# Patient Record
Sex: MALE | Race: WHITE | NOT HISPANIC OR LATINO | URBAN - METROPOLITAN AREA
[De-identification: names, ages, dates, MRNs, and addresses within clinical notes are randomized per-mention and may not be internally consistent; named-entity substitution may affect disease eponyms.]

---

## 2015-09-28 RX ORDER — LEVOTHYROXINE SODIUM 125 MCG
1 TABLET ORAL
Qty: 0 | Refills: 0 | DISCHARGE
Start: 2015-09-28

## 2017-03-01 ENCOUNTER — EMERGENCY (EMERGENCY)
Facility: HOSPITAL | Age: 72
LOS: 1 days | Discharge: HOME | End: 2017-03-01
Attending: EMERGENCY MEDICINE | Admitting: EMERGENCY MEDICINE
Payer: MEDICARE

## 2017-03-01 VITALS
RESPIRATION RATE: 18 BRPM | DIASTOLIC BLOOD PRESSURE: 74 MMHG | TEMPERATURE: 98 F | HEART RATE: 116 BPM | SYSTOLIC BLOOD PRESSURE: 142 MMHG | OXYGEN SATURATION: 96 %

## 2017-03-01 VITALS
HEART RATE: 79 BPM | SYSTOLIC BLOOD PRESSURE: 166 MMHG | RESPIRATION RATE: 19 BRPM | OXYGEN SATURATION: 96 % | TEMPERATURE: 100 F | DIASTOLIC BLOOD PRESSURE: 75 MMHG

## 2017-03-01 DIAGNOSIS — Z98.89 OTHER SPECIFIED POSTPROCEDURAL STATES: Chronic | ICD-10-CM

## 2017-03-01 DIAGNOSIS — Z96.659 PRESENCE OF UNSPECIFIED ARTIFICIAL KNEE JOINT: Chronic | ICD-10-CM

## 2017-03-01 LAB
ALBUMIN SERPL ELPH-MCNC: 4.4 G/DL — SIGNIFICANT CHANGE UP (ref 3.3–5)
ALP SERPL-CCNC: 47 U/L — SIGNIFICANT CHANGE UP (ref 40–120)
ALT FLD-CCNC: 16 U/L — SIGNIFICANT CHANGE UP (ref 4–41)
APPEARANCE UR: CLEAR — SIGNIFICANT CHANGE UP
AST SERPL-CCNC: 15 U/L — SIGNIFICANT CHANGE UP (ref 4–40)
BACTERIA # UR AUTO: SIGNIFICANT CHANGE UP
BASOPHILS # BLD AUTO: 0.04 K/UL — SIGNIFICANT CHANGE UP (ref 0–0.2)
BASOPHILS NFR BLD AUTO: 0.2 % — SIGNIFICANT CHANGE UP (ref 0–2)
BASOPHILS NFR SPEC: 0 % — SIGNIFICANT CHANGE UP (ref 0–2)
BILIRUB SERPL-MCNC: 0.7 MG/DL — SIGNIFICANT CHANGE UP (ref 0.2–1.2)
BILIRUB UR-MCNC: NEGATIVE — SIGNIFICANT CHANGE UP
BLOOD UR QL VISUAL: HIGH
BUN SERPL-MCNC: 14 MG/DL — SIGNIFICANT CHANGE UP (ref 7–23)
CALCIUM SERPL-MCNC: 9.2 MG/DL — SIGNIFICANT CHANGE UP (ref 8.4–10.5)
CHLORIDE SERPL-SCNC: 100 MMOL/L — SIGNIFICANT CHANGE UP (ref 98–107)
CO2 SERPL-SCNC: 23 MMOL/L — SIGNIFICANT CHANGE UP (ref 22–31)
COLOR SPEC: SIGNIFICANT CHANGE UP
CREAT SERPL-MCNC: 0.92 MG/DL — SIGNIFICANT CHANGE UP (ref 0.5–1.3)
EOSINOPHIL # BLD AUTO: 0.36 K/UL — SIGNIFICANT CHANGE UP (ref 0–0.5)
EOSINOPHIL NFR BLD AUTO: 2 % — SIGNIFICANT CHANGE UP (ref 0–6)
EOSINOPHIL NFR FLD: 1 % — SIGNIFICANT CHANGE UP (ref 0–6)
GLUCOSE SERPL-MCNC: 107 MG/DL — HIGH (ref 70–99)
GLUCOSE UR-MCNC: NEGATIVE — SIGNIFICANT CHANGE UP
HCT VFR BLD CALC: 39.6 % — SIGNIFICANT CHANGE UP (ref 39–50)
HGB BLD-MCNC: 13 G/DL — SIGNIFICANT CHANGE UP (ref 13–17)
IMM GRANULOCYTES NFR BLD AUTO: 3.4 % — HIGH (ref 0–1.5)
KETONES UR-MCNC: NEGATIVE — SIGNIFICANT CHANGE UP
LEUKOCYTE ESTERASE UR-ACNC: NEGATIVE — SIGNIFICANT CHANGE UP
LYMPHOCYTES # BLD AUTO: 10.06 K/UL — HIGH (ref 1–3.3)
LYMPHOCYTES # BLD AUTO: 54.5 % — HIGH (ref 13–44)
LYMPHOCYTES NFR SPEC AUTO: 47 % — HIGH (ref 13–44)
MANUAL SMEAR VERIFICATION: SIGNIFICANT CHANGE UP
MCHC RBC-ENTMCNC: 28.6 PG — SIGNIFICANT CHANGE UP (ref 27–34)
MCHC RBC-ENTMCNC: 32.8 % — SIGNIFICANT CHANGE UP (ref 32–36)
MCV RBC AUTO: 87 FL — SIGNIFICANT CHANGE UP (ref 80–100)
MONOCYTES # BLD AUTO: 1.69 K/UL — HIGH (ref 0–0.9)
MONOCYTES NFR BLD AUTO: 9.2 % — SIGNIFICANT CHANGE UP (ref 2–14)
MONOCYTES NFR BLD: 9 % — SIGNIFICANT CHANGE UP (ref 2–9)
MORPHOLOGY BLD-IMP: NORMAL — SIGNIFICANT CHANGE UP
MUCOUS THREADS # UR AUTO: SIGNIFICANT CHANGE UP
NEUTROPHIL AB SER-ACNC: 40 % — LOW (ref 43–77)
NEUTROPHILS # BLD AUTO: 5.69 K/UL — SIGNIFICANT CHANGE UP (ref 1.8–7.4)
NEUTROPHILS NFR BLD AUTO: 30.7 % — LOW (ref 43–77)
NITRITE UR-MCNC: NEGATIVE — SIGNIFICANT CHANGE UP
PH UR: 6 — SIGNIFICANT CHANGE UP (ref 4.6–8)
PLATELET # BLD AUTO: 113 K/UL — LOW (ref 150–400)
PLATELET COUNT - ESTIMATE: SIGNIFICANT CHANGE UP
PMV BLD: 11.2 FL — SIGNIFICANT CHANGE UP (ref 7–13)
POTASSIUM SERPL-MCNC: 4.4 MMOL/L — SIGNIFICANT CHANGE UP (ref 3.5–5.3)
POTASSIUM SERPL-SCNC: 4.4 MMOL/L — SIGNIFICANT CHANGE UP (ref 3.5–5.3)
PROT SERPL-MCNC: 6.6 G/DL — SIGNIFICANT CHANGE UP (ref 6–8.3)
PROT UR-MCNC: NEGATIVE — SIGNIFICANT CHANGE UP
RBC # BLD: 4.55 M/UL — SIGNIFICANT CHANGE UP (ref 4.2–5.8)
RBC # FLD: 13.6 % — SIGNIFICANT CHANGE UP (ref 10.3–14.5)
RBC CASTS # UR COMP ASSIST: SIGNIFICANT CHANGE UP (ref 0–?)
SODIUM SERPL-SCNC: 140 MMOL/L — SIGNIFICANT CHANGE UP (ref 135–145)
SP GR SPEC: 1.01 — SIGNIFICANT CHANGE UP (ref 1–1.03)
UROBILINOGEN FLD QL: NORMAL E.U. — SIGNIFICANT CHANGE UP (ref 0.1–0.2)
VARIANT LYMPHS # BLD: 3 % — SIGNIFICANT CHANGE UP
WBC # BLD: 18.46 K/UL — HIGH (ref 3.8–10.5)
WBC # FLD AUTO: 18.46 K/UL — HIGH (ref 3.8–10.5)
WBC UR QL: SIGNIFICANT CHANGE UP (ref 0–?)

## 2017-03-01 PROCEDURE — 99284 EMERGENCY DEPT VISIT MOD MDM: CPT | Mod: GC

## 2017-03-01 PROCEDURE — 71020: CPT | Mod: 26

## 2017-03-01 RX ORDER — SODIUM CHLORIDE 9 MG/ML
1000 INJECTION INTRAMUSCULAR; INTRAVENOUS; SUBCUTANEOUS ONCE
Qty: 0 | Refills: 0 | Status: COMPLETED | OUTPATIENT
Start: 2017-03-01 | End: 2017-03-01

## 2017-03-01 RX ADMIN — SODIUM CHLORIDE 1000 MILLILITER(S): 9 INJECTION INTRAMUSCULAR; INTRAVENOUS; SUBCUTANEOUS at 17:38

## 2017-03-01 RX ADMIN — SODIUM CHLORIDE 1000 MILLILITER(S): 9 INJECTION INTRAMUSCULAR; INTRAVENOUS; SUBCUTANEOUS at 15:34

## 2017-03-01 NOTE — ED ADULT TRIAGE NOTE - CHIEF COMPLAINT QUOTE
pt presents via ems for evaluation of ams, possible seizure- found making strange sounds with arms clenched to chest. per EMS son did chest compressions x 1 minute because "he was unsure if his father was dead". per ems, pt is appropriately answering questions FS: 109 PMHX: seizures, leukemia, thyroid ca, mitral valve prolapse, vtach, anemia

## 2017-03-01 NOTE — ED PROVIDER NOTE - OBJECTIVE STATEMENT
70 y/o M w/ Hx thyroid CA (resolved), seizure d/o pw suspected seizure.  Pt witnessed event, w/ LOC, clonus of UE consistent w/ prior seizures.  Event lasted for approx 1 min, w/ post-ictal state.  Denies recent fever, chills, cough, CP, SOB, AP, n/v, d/c.  Pt asymptomatic at time of exam. 70 y/o M w/ Hx thyroid CA (resolved), seizure d/o pw suspected seizure.  Pt witnessed event, w/ LOC, clonus of UE consistent w/ prior seizures, no urinary incontinence.  Event lasted for approx 1 min, w/ post-ictal state.  Denies recent fever, chills, cough, CP, SOB, AP, n/v, d/c.  Pt asymptomatic at time of exam.  Does not take regular anti-epileptic.

## 2017-03-01 NOTE — ED PROVIDER NOTE - PROGRESS NOTE DETAILS
on further d/w pt, has been skipping doses of keppra, encouraged to take all medications as directed.  Remains at baseline mental status.  Stable for d/c. on further d/w pt, has been skipping doses of keppra intermittently, encouraged to take all medications as directed.  Remains at baseline mental status.  to take todays dose (keppra 1250 BID)- Stable for d/c.

## 2019-11-13 ENCOUNTER — OUTPATIENT (OUTPATIENT)
Dept: OUTPATIENT SERVICES | Facility: HOSPITAL | Age: 74
LOS: 1 days | End: 2019-11-13
Payer: MEDICARE

## 2019-11-13 VITALS
RESPIRATION RATE: 16 BRPM | TEMPERATURE: 98 F | DIASTOLIC BLOOD PRESSURE: 80 MMHG | HEART RATE: 63 BPM | SYSTOLIC BLOOD PRESSURE: 147 MMHG | HEIGHT: 76 IN | WEIGHT: 248.9 LBS | OXYGEN SATURATION: 96 %

## 2019-11-13 DIAGNOSIS — Z98.89 OTHER SPECIFIED POSTPROCEDURAL STATES: Chronic | ICD-10-CM

## 2019-11-13 DIAGNOSIS — S84.10XA INJURY OF PERONEAL NERVE AT LOWER LEG LEVEL, UNSPECIFIED LEG, INITIAL ENCOUNTER: Chronic | ICD-10-CM

## 2019-11-13 DIAGNOSIS — Z98.49 CATARACT EXTRACTION STATUS, UNSPECIFIED EYE: Chronic | ICD-10-CM

## 2019-11-13 DIAGNOSIS — Z12.11 ENCOUNTER FOR SCREENING FOR MALIGNANT NEOPLASM OF COLON: ICD-10-CM

## 2019-11-13 DIAGNOSIS — Z89.422 ACQUIRED ABSENCE OF OTHER LEFT TOE(S): Chronic | ICD-10-CM

## 2019-11-13 DIAGNOSIS — Z98.890 OTHER SPECIFIED POSTPROCEDURAL STATES: Chronic | ICD-10-CM

## 2019-11-13 DIAGNOSIS — Z86.010 PERSONAL HISTORY OF COLONIC POLYPS: ICD-10-CM

## 2019-11-13 DIAGNOSIS — Z96.659 PRESENCE OF UNSPECIFIED ARTIFICIAL KNEE JOINT: Chronic | ICD-10-CM

## 2019-11-13 DIAGNOSIS — Z01.818 ENCOUNTER FOR OTHER PREPROCEDURAL EXAMINATION: ICD-10-CM

## 2019-11-13 DIAGNOSIS — D69.6 THROMBOCYTOPENIA, UNSPECIFIED: ICD-10-CM

## 2019-11-13 DIAGNOSIS — G47.33 OBSTRUCTIVE SLEEP APNEA (ADULT) (PEDIATRIC): ICD-10-CM

## 2019-11-13 DIAGNOSIS — Z87.19 PERSONAL HISTORY OF OTHER DISEASES OF THE DIGESTIVE SYSTEM: ICD-10-CM

## 2019-11-13 DIAGNOSIS — D50.9 IRON DEFICIENCY ANEMIA, UNSPECIFIED: ICD-10-CM

## 2019-11-13 DIAGNOSIS — K55.20 ANGIODYSPLASIA OF COLON WITHOUT HEMORRHAGE: ICD-10-CM

## 2019-11-13 LAB
ALBUMIN SERPL ELPH-MCNC: 4.8 G/DL — SIGNIFICANT CHANGE UP (ref 3.3–5)
ALP SERPL-CCNC: 53 U/L — SIGNIFICANT CHANGE UP (ref 40–120)
ALT FLD-CCNC: 15 U/L — SIGNIFICANT CHANGE UP (ref 10–45)
ANION GAP SERPL CALC-SCNC: 11 MMOL/L — SIGNIFICANT CHANGE UP (ref 5–17)
AST SERPL-CCNC: 14 U/L — SIGNIFICANT CHANGE UP (ref 10–40)
BILIRUB SERPL-MCNC: 0.4 MG/DL — SIGNIFICANT CHANGE UP (ref 0.2–1.2)
BLD GP AB SCN SERPL QL: NEGATIVE — SIGNIFICANT CHANGE UP
BUN SERPL-MCNC: 11 MG/DL — SIGNIFICANT CHANGE UP (ref 7–23)
CALCIUM SERPL-MCNC: 9.3 MG/DL — SIGNIFICANT CHANGE UP (ref 8.4–10.5)
CHLORIDE SERPL-SCNC: 101 MMOL/L — SIGNIFICANT CHANGE UP (ref 96–108)
CO2 SERPL-SCNC: 27 MMOL/L — SIGNIFICANT CHANGE UP (ref 22–31)
CREAT SERPL-MCNC: 0.97 MG/DL — SIGNIFICANT CHANGE UP (ref 0.5–1.3)
GLUCOSE SERPL-MCNC: 89 MG/DL — SIGNIFICANT CHANGE UP (ref 70–99)
POTASSIUM SERPL-MCNC: 4.4 MMOL/L — SIGNIFICANT CHANGE UP (ref 3.5–5.3)
POTASSIUM SERPL-SCNC: 4.4 MMOL/L — SIGNIFICANT CHANGE UP (ref 3.5–5.3)
PROT SERPL-MCNC: 6.8 G/DL — SIGNIFICANT CHANGE UP (ref 6–8.3)
RH IG SCN BLD-IMP: POSITIVE — SIGNIFICANT CHANGE UP
SODIUM SERPL-SCNC: 139 MMOL/L — SIGNIFICANT CHANGE UP (ref 135–145)

## 2019-11-13 PROCEDURE — 86901 BLOOD TYPING SEROLOGIC RH(D): CPT

## 2019-11-13 PROCEDURE — 80053 COMPREHEN METABOLIC PANEL: CPT

## 2019-11-13 PROCEDURE — G0463: CPT

## 2019-11-13 PROCEDURE — 86850 RBC ANTIBODY SCREEN: CPT

## 2019-11-13 PROCEDURE — 86900 BLOOD TYPING SEROLOGIC ABO: CPT

## 2019-11-13 NOTE — H&P PST ADULT - HISTORY OF PRESENT ILLNESS
75 yo male with h/o CLL (on Venetoclax), thrombocytopenia, atrial fibrillation s/p cardioversion (no AC at present), seizure history (last 2016 - on Keppra), and ELISSA presents for colonoscopy and endoscopy on 11/26/2019.    Pt's platelets were 55 on 11/13/19. Pt's wife states that Heme/Onc Dr Eddy recommended that patient have platelet transfusion prior to proposed colonoscopy/endoscopy.

## 2019-11-13 NOTE — H&P PST ADULT - NSICDXPROBLEM_GEN_ALL_CORE_FT
PROBLEM DIAGNOSES  Problem: Colon cancer screening  Assessment and Plan: Colonoscopy  Endoscopy    Problem: Thrombocytopenia  Assessment and Plan: Call Dr Eddy ([facility]) to request plan/recommendations for periop management og thrombocytopenia. Attempted to call 11/13/2019, however office was already closed    Problem: ELISSA (obstructive sleep apnea)  Assessment and Plan: ELISSA Precautions  End[facility] Book[facility] notified

## 2019-11-13 NOTE — H&P PST ADULT - ANESTHESIA, PREVIOUS REACTION, PROFILE
no personal history of anesthesia complications; mother took a long time to wake up from anesthesia - did not require reintubation no personal history of anesthesia complications; 'mother took a long time to wake up from anesthesia" with arm surgery - did not require reintubation

## 2019-11-13 NOTE — H&P PST ADULT - ABILITY TO HEAR (WITH HEARING AID OR HEARING APPLIANCE IF NORMALLY USED):
Mildly to Moderately Impaired: difficulty hearing in some environments or speaker may need to increase volume or speak distinctly/no HA
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
## 2019-11-13 NOTE — H&P PST ADULT - OTHER CARE PROVIDERS
Dr Barrie Cordova Cardiologist [phone]; Dr Tarun Seaman [phone]; Dr Eddy Heme/Onc [phone]; Dr Lugo Neurologist [phone]

## 2019-11-26 ENCOUNTER — RESULT REVIEW (OUTPATIENT)
Age: 74
End: 2019-11-26

## 2019-11-26 ENCOUNTER — OUTPATIENT (OUTPATIENT)
Dept: OUTPATIENT SERVICES | Facility: HOSPITAL | Age: 74
LOS: 1 days | End: 2019-11-26
Payer: MEDICARE

## 2019-11-26 DIAGNOSIS — Z98.89 OTHER SPECIFIED POSTPROCEDURAL STATES: Chronic | ICD-10-CM

## 2019-11-26 DIAGNOSIS — Z98.890 OTHER SPECIFIED POSTPROCEDURAL STATES: Chronic | ICD-10-CM

## 2019-11-26 DIAGNOSIS — S84.10XA INJURY OF PERONEAL NERVE AT LOWER LEG LEVEL, UNSPECIFIED LEG, INITIAL ENCOUNTER: Chronic | ICD-10-CM

## 2019-11-26 DIAGNOSIS — Z98.49 CATARACT EXTRACTION STATUS, UNSPECIFIED EYE: Chronic | ICD-10-CM

## 2019-11-26 DIAGNOSIS — K55.20 ANGIODYSPLASIA OF COLON WITHOUT HEMORRHAGE: ICD-10-CM

## 2019-11-26 DIAGNOSIS — Z86.010 PERSONAL HISTORY OF COLONIC POLYPS: ICD-10-CM

## 2019-11-26 DIAGNOSIS — Z01.818 ENCOUNTER FOR OTHER PREPROCEDURAL EXAMINATION: ICD-10-CM

## 2019-11-26 DIAGNOSIS — Z87.19 PERSONAL HISTORY OF OTHER DISEASES OF THE DIGESTIVE SYSTEM: ICD-10-CM

## 2019-11-26 DIAGNOSIS — D50.9 IRON DEFICIENCY ANEMIA, UNSPECIFIED: ICD-10-CM

## 2019-11-26 DIAGNOSIS — Z96.659 PRESENCE OF UNSPECIFIED ARTIFICIAL KNEE JOINT: Chronic | ICD-10-CM

## 2019-11-26 DIAGNOSIS — Z89.422 ACQUIRED ABSENCE OF OTHER LEFT TOE(S): Chronic | ICD-10-CM

## 2019-11-26 PROCEDURE — C1889: CPT

## 2019-11-26 PROCEDURE — 45388 COLONOSCOPY W/ABLATION: CPT

## 2019-11-26 PROCEDURE — 45380 COLONOSCOPY AND BIOPSY: CPT | Mod: XS

## 2019-11-26 PROCEDURE — 88305 TISSUE EXAM BY PATHOLOGIST: CPT

## 2019-11-26 PROCEDURE — 88305 TISSUE EXAM BY PATHOLOGIST: CPT | Mod: 26

## 2019-11-26 PROCEDURE — 45385 COLONOSCOPY W/LESION REMOVAL: CPT | Mod: XS

## 2019-11-26 PROCEDURE — 43235 EGD DIAGNOSTIC BRUSH WASH: CPT

## 2019-11-27 LAB — SURGICAL PATHOLOGY STUDY: SIGNIFICANT CHANGE UP

## 2020-07-29 NOTE — ED ADULT NURSE REASSESSMENT NOTE - NS ED NURSE REASSESS COMMENT FT1
Pt awake and alert x 3 no seizure activity noted  sinus on monitor b/p wnl. Pt denies any chest pain c/o feeling dizzy.  iv placed fluids infusing per md orders awaiting dispo.
[address]23 Detailed

## 2020-12-11 ENCOUNTER — INPATIENT (INPATIENT)
Facility: HOSPITAL | Age: 75
LOS: 2 days | Discharge: HOME | DRG: 65 | End: 2020-12-14
Attending: PSYCHIATRY & NEUROLOGY | Admitting: PSYCHIATRY & NEUROLOGY
Payer: MEDICARE

## 2020-12-11 VITALS
HEIGHT: 76 IN | DIASTOLIC BLOOD PRESSURE: 76 MMHG | HEART RATE: 78 BPM | WEIGHT: 255.96 LBS | SYSTOLIC BLOOD PRESSURE: 154 MMHG | RESPIRATION RATE: 19 BRPM | TEMPERATURE: 99 F | OXYGEN SATURATION: 97 %

## 2020-12-11 DIAGNOSIS — Z89.422 ACQUIRED ABSENCE OF OTHER LEFT TOE(S): Chronic | ICD-10-CM

## 2020-12-11 DIAGNOSIS — Z98.89 OTHER SPECIFIED POSTPROCEDURAL STATES: Chronic | ICD-10-CM

## 2020-12-11 DIAGNOSIS — R29.898 OTHER SYMPTOMS AND SIGNS INVOLVING THE MUSCULOSKELETAL SYSTEM: ICD-10-CM

## 2020-12-11 DIAGNOSIS — S84.10XA INJURY OF PERONEAL NERVE AT LOWER LEG LEVEL, UNSPECIFIED LEG, INITIAL ENCOUNTER: Chronic | ICD-10-CM

## 2020-12-11 DIAGNOSIS — Z98.890 OTHER SPECIFIED POSTPROCEDURAL STATES: Chronic | ICD-10-CM

## 2020-12-11 DIAGNOSIS — Z98.49 CATARACT EXTRACTION STATUS, UNSPECIFIED EYE: Chronic | ICD-10-CM

## 2020-12-11 DIAGNOSIS — Z96.659 PRESENCE OF UNSPECIFIED ARTIFICIAL KNEE JOINT: Chronic | ICD-10-CM

## 2020-12-11 LAB
ACANTHOCYTES BLD QL SMEAR: SLIGHT — SIGNIFICANT CHANGE UP
ALBUMIN SERPL ELPH-MCNC: 4.5 G/DL — SIGNIFICANT CHANGE UP (ref 3.3–5)
ALP SERPL-CCNC: 50 U/L — SIGNIFICANT CHANGE UP (ref 40–120)
ALT FLD-CCNC: 17 U/L — SIGNIFICANT CHANGE UP (ref 10–45)
ANION GAP SERPL CALC-SCNC: 11 MMOL/L — SIGNIFICANT CHANGE UP (ref 5–17)
ANISOCYTOSIS BLD QL: SLIGHT — SIGNIFICANT CHANGE UP
APPEARANCE UR: CLEAR — SIGNIFICANT CHANGE UP
AST SERPL-CCNC: 16 U/L — SIGNIFICANT CHANGE UP (ref 10–40)
BASOPHILS # BLD AUTO: 0.1 K/UL — SIGNIFICANT CHANGE UP (ref 0–0.2)
BASOPHILS NFR BLD AUTO: 1 % — SIGNIFICANT CHANGE UP (ref 0–2)
BILIRUB SERPL-MCNC: 0.5 MG/DL — SIGNIFICANT CHANGE UP (ref 0.2–1.2)
BILIRUB UR-MCNC: NEGATIVE — SIGNIFICANT CHANGE UP
BUN SERPL-MCNC: 12 MG/DL — SIGNIFICANT CHANGE UP (ref 7–23)
CALCIUM SERPL-MCNC: 8.8 MG/DL — SIGNIFICANT CHANGE UP (ref 8.4–10.5)
CHLORIDE SERPL-SCNC: 104 MMOL/L — SIGNIFICANT CHANGE UP (ref 96–108)
CO2 SERPL-SCNC: 26 MMOL/L — SIGNIFICANT CHANGE UP (ref 22–31)
COLOR SPEC: SIGNIFICANT CHANGE UP
CREAT SERPL-MCNC: 0.93 MG/DL — SIGNIFICANT CHANGE UP (ref 0.5–1.3)
DACRYOCYTES BLD QL SMEAR: SLIGHT — SIGNIFICANT CHANGE UP
DIFF PNL FLD: NEGATIVE — SIGNIFICANT CHANGE UP
ELLIPTOCYTES BLD QL SMEAR: SLIGHT — SIGNIFICANT CHANGE UP
EOSINOPHIL # BLD AUTO: 0.48 K/UL — SIGNIFICANT CHANGE UP (ref 0–0.5)
EOSINOPHIL NFR BLD AUTO: 5 % — SIGNIFICANT CHANGE UP (ref 0–6)
GIANT PLATELETS BLD QL SMEAR: PRESENT — SIGNIFICANT CHANGE UP
GLUCOSE SERPL-MCNC: 88 MG/DL — SIGNIFICANT CHANGE UP (ref 70–99)
GLUCOSE UR QL: NEGATIVE — SIGNIFICANT CHANGE UP
HCT VFR BLD CALC: 38.4 % — LOW (ref 39–50)
HGB BLD-MCNC: 12.1 G/DL — LOW (ref 13–17)
KETONES UR-MCNC: NEGATIVE — SIGNIFICANT CHANGE UP
LEUKOCYTE ESTERASE UR-ACNC: NEGATIVE — SIGNIFICANT CHANGE UP
LYMPHOCYTES # BLD AUTO: 1.55 K/UL — SIGNIFICANT CHANGE UP (ref 1–3.3)
LYMPHOCYTES # BLD AUTO: 16 % — SIGNIFICANT CHANGE UP (ref 13–44)
MANUAL SMEAR VERIFICATION: SIGNIFICANT CHANGE UP
MCHC RBC-ENTMCNC: 27.9 PG — SIGNIFICANT CHANGE UP (ref 27–34)
MCHC RBC-ENTMCNC: 31.5 GM/DL — LOW (ref 32–36)
MCV RBC AUTO: 88.5 FL — SIGNIFICANT CHANGE UP (ref 80–100)
MONOCYTES # BLD AUTO: 1.35 K/UL — HIGH (ref 0–0.9)
MONOCYTES NFR BLD AUTO: 14 % — SIGNIFICANT CHANGE UP (ref 2–14)
NEUTROPHILS # BLD AUTO: 3.38 K/UL — SIGNIFICANT CHANGE UP (ref 1.8–7.4)
NEUTROPHILS NFR BLD AUTO: 34 % — LOW (ref 43–77)
NEUTS BAND # BLD: 1 % — SIGNIFICANT CHANGE UP (ref 0–8)
NITRITE UR-MCNC: NEGATIVE — SIGNIFICANT CHANGE UP
NRBC # BLD: 0 /100 — SIGNIFICANT CHANGE UP (ref 0–0)
PH UR: 6.5 — SIGNIFICANT CHANGE UP (ref 5–8)
PLAT MORPH BLD: ABNORMAL
PLATELET # BLD AUTO: 49 K/UL — LOW (ref 150–400)
POIKILOCYTOSIS BLD QL AUTO: SLIGHT — SIGNIFICANT CHANGE UP
POTASSIUM SERPL-MCNC: 4.1 MMOL/L — SIGNIFICANT CHANGE UP (ref 3.5–5.3)
POTASSIUM SERPL-SCNC: 4.1 MMOL/L — SIGNIFICANT CHANGE UP (ref 3.5–5.3)
PROT SERPL-MCNC: 6 G/DL — SIGNIFICANT CHANGE UP (ref 6–8.3)
PROT UR-MCNC: NEGATIVE — SIGNIFICANT CHANGE UP
RBC # BLD: 4.34 M/UL — SIGNIFICANT CHANGE UP (ref 4.2–5.8)
RBC # FLD: 13 % — SIGNIFICANT CHANGE UP (ref 10.3–14.5)
RBC BLD AUTO: ABNORMAL
SODIUM SERPL-SCNC: 141 MMOL/L — SIGNIFICANT CHANGE UP (ref 135–145)
SP GR SPEC: 1.01 — LOW (ref 1.01–1.02)
TROPONIN T, HIGH SENSITIVITY RESULT: 26 NG/L — SIGNIFICANT CHANGE UP (ref 0–51)
TROPONIN T, HIGH SENSITIVITY RESULT: 28 NG/L — SIGNIFICANT CHANGE UP (ref 0–51)
UROBILINOGEN FLD QL: NEGATIVE — SIGNIFICANT CHANGE UP
VARIANT LYMPHS # BLD: 29 % — HIGH (ref 0–6)
WBC # BLD: 9.67 K/UL — SIGNIFICANT CHANGE UP (ref 3.8–10.5)
WBC # FLD AUTO: 9.67 K/UL — SIGNIFICANT CHANGE UP (ref 3.8–10.5)

## 2020-12-11 PROCEDURE — 71045 X-RAY EXAM CHEST 1 VIEW: CPT | Mod: 26

## 2020-12-11 PROCEDURE — 93010 ELECTROCARDIOGRAM REPORT: CPT

## 2020-12-11 PROCEDURE — 70496 CT ANGIOGRAPHY HEAD: CPT | Mod: 26

## 2020-12-11 PROCEDURE — 99233 SBSQ HOSP IP/OBS HIGH 50: CPT

## 2020-12-11 PROCEDURE — 99285 EMERGENCY DEPT VISIT HI MDM: CPT | Mod: CS,GC

## 2020-12-11 PROCEDURE — 70498 CT ANGIOGRAPHY NECK: CPT | Mod: 26

## 2020-12-11 NOTE — ED PROVIDER NOTE - CLINICAL SUMMARY MEDICAL DECISION MAKING FREE TEXT BOX
75 male, Hx: Seizure Disorder (1250 mg Keppra, daily, compliant), HTN, HLD, AFib (not A/C) - presents with B/L UE numbness/tingling extending from B/L wrist to the hands since Monday 12/7/2020. MRI Today with concern for cortical infarct. Exam, presentation, and history concerning for CVA vs. Seizure Disorder - minimal concern for meningitis, encephalitis, trauma. Plan: CBC, CMP, EKG, CXR, Ua, UCX, Neuro Consult for admission.

## 2020-12-11 NOTE — ED PROVIDER NOTE - ATTENDING CONTRIBUTION TO CARE
I have personally performed a face to face medical and diagnostic evaluation of the patient. I have discussed with and reviewed the Resident's note and agree with the History, ROS, Physical Exam and MDM unless otherwise indicated. A brief summary of my personal evaluation and impression can be found below.    75F hx of seizure on keppra HTN HLD Afib no AC presents with a cc of abnormal MRI in setting of b/l UE numbness and tingling to UE, since Monday. No other complaints. Otherwise feels well. Per tech had abnormal MRI and was sent to ED for eval. Denies n/v/f/c/cp/sob. Denies headache, syncope, lightheadedness, dizziness. Denies chest palpitations, abdominal pain. Denies edema. Denies dysuria, hematuria, BRBPR, tarry stools, diarrhea, constipation.    All other ROS negative, except as above and as per HPI and ROS section.    VITALS: Initial triage and subsequent vitals have been reviewed by me.  GEN APPEARANCE: WDWN, alert, non-toxic, NAD  HEAD: Atraumatic.  EYES: PERRLa, EOMI, vision grossly intact.   NECK: Supple  CV: RRR, S1S2, no c/r/m/g. Cap refill <2 seconds. No bruits.   LUNGS: CTAB. No abnormal breath sounds.  ABDOMEN: Soft, NTND. No guarding or rebound.   MSK/EXT: No spinal or extremity point tenderness. No CVA ttp. Pelvis stable. No peripheral edema.  NEURO: Alert, follows commands. Weight bearing normal. steady gait. Speech normal. mild difficulty with hand  RUE, difficulty with index to pinky, otherwise Sensation and motor normal x4 extremities. CN2-12 normal, ambulating normally.  SKIN: Warm, dry and intact. No rash.  PSYCH: Appropriate    Plan/MDM: 75M hx of seizure dx presents with a cc of b/l UE weakness found to have abnormal MRI today prompting ED visit, no other complaints. Will check labs, discuss with neuro, reassess, dispo thereafter.

## 2020-12-11 NOTE — ED PROVIDER NOTE - PHYSICAL EXAMINATION
GEN - NAD; well appearing; A+Ox3   HEAD - NC/AT, No visible Ecchymosis, No Abrasions, No Lacerations/Skin Tears     EYES - EOMI, PERRL, no conjunctival pallor, no scleral icterus  ENT -   mucous membranes  moist , no discharge      NECK - Neck supple, No LAD, No Swelling  PULM - CTA B/L,  symmetric breath sounds  COR -  RRR, S1 S2, no murmurs  ABD - NT/ND, soft, no guarding, no rebound, no masses    BACK - no CVA tenderness  EXTREMS - 0+ edema, no gross deformity, warm and well perfused, no calf tenderness/swelling/erythema    SKIN - no rash or bruising      NEUROLOGIC - alert, CN3-12 intact, sensation nl, moving all 4 ext with 5/5 Strength; Slight Residual Weakness noticed on Ulnar Division of Right Hand (4/5)

## 2020-12-11 NOTE — ED PROVIDER NOTE - PMH
Atrial fibrillation  s/p cardioversion x 3 - last 1/2019 - no longer on AC  Charcot's joint of foot, left    CLL (chronic lymphocytic leukemia)  diagnosed 11/2007 - was on imbrutinib, currently taking venetoclax  H/O thrombocytopenia    Hypothyroidism  acquired - s/p thyroidectomy 1975 for papillary cancer of thyroid  Left foot drop    Mitral valve prolapse    Non-healing open wound of toe  left great toe - follows with ID - has recently demonstrated healing - no surgical intervention currently planned  Obesity    Osteomyelitis    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's, while in  - treated with isoniazid  PTSD (post-traumatic stress disorder)    Seizure  first was 1990's - on Keppra, last seizure was 2016  Sleep apnea  mild, per family - no CPAP  SVT (supraventricular tachycardia)    Thyroid carcinoma    V tach    Vaso vagal episode
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
## 2020-12-11 NOTE — ED ADULT NURSE NOTE - PAIN RATING/NUMBER SCALE (0-10): REST
This is a recent snapshot of the patient's [facility] medical record.  For current drug dose and complete information and questions, call [phone] or In Basket pool, [facility] home infusion ([id number])  CSN Number:  [id number]      
0

## 2020-12-11 NOTE — ED ADULT NURSE NOTE - OBJECTIVE STATEMENT
76 y/o male  arrives to the ER via EMS complaining of bilateral numbness on the arms. PMH of HTN, Seizures on kepra. Pt is A&Ox4, speaking coherently. Pt states this Monday he started felling tingling weakness and numbness on both arms. Pt went to his PCP who ordered a MRI of the brain. MRI had  abnormal findings, reason the patient was sent to the ER for further examination. Patient states not felling tingling or numbness at this time, neuro assessment with not deficits On assessment airway is patent, breathing spontaneously and unlabored, skin is dry, warm and color appropriate to race.abdomen is soft, non distended, non tender, full  ROM in all extremities, equal strengh, sensitivity in all extremities.  Pt denies SOB, chest pain, N/V/D, fevers, chills. Comfort measures provided. call bell within reach, bed locked in the lowest position. will continue to reassess .

## 2020-12-11 NOTE — ED PROVIDER NOTE - OBJECTIVE STATEMENT
75 male, Hx: Seizure Disorder (1250 mg Keppra, daily, compliant), HTN, HLD, AFib (not A/C) - presents with B/L UE numbness/tingling extending from B/L wrist to the hands since Monday 12/7/2020. Denies any visual changes, nausea, vomiting, aphasia, other focal weakness/numbness/tingling. Denies any headache. Denies any fevers, chills, cough, CP, SOB, abdominal pain, diarrhea, constipation, bloody stools, dysuric symptoms. Last seizure was Jul4 2020. Denies any LOC; CP, Palpitations, SOB.     Saw PMD - was sent for MRI Head today, where MRI tech told them he saw a large infarct - and was sent here for further eval and management. Symptoms persist at present.

## 2020-12-11 NOTE — ED PROVIDER NOTE - NS ED ROS FT
Constitution: No Fever or chills  Eyes: No visual changes  HEENT: No cough, No Discharge, No Rhinorrhea, No URI symptoms  Cardio: No Chest pain, No Palpitations, No Dyspnea  Resp: No SOB, No Wheezing  GI: No abdominal pain, No Nausea, No Vomiting, No Constipation, No Diarrhea  : No burning upon urination, trouble urinating, no foul odor from urine  MSK: No Back pain, (+) B/L UE Numbness and Tingling, No Weakness  Neuro: No Headache, No changes to Vision; (L) Foot Drop Known to patient  Skin: No rashes, No Bruising, No Swelling

## 2020-12-11 NOTE — ED ADULT TRIAGE NOTE - ARRIVAL FROM
Home Crescentic Advancement Flap Text: The defect edges were debeveled with a #15 scalpel blade.  Given the location of the defect and the proximity to free margins a crescentic advancement flap was deemed most appropriate.  Using a sterile surgical marker, the appropriate advancement flap was drawn incorporating the defect and placing the expected incisions within the relaxed skin tension lines where possible.    The area thus outlined was incised deep to adipose tissue with a #15 scalpel blade.  The skin margins were undermined to an appropriate distance in all directions utilizing iris scissors.

## 2020-12-12 PROBLEM — M14.672 CHARCOT'S JOINT, LEFT ANKLE AND FOOT: Chronic | Status: ACTIVE | Noted: 2019-11-13

## 2020-12-12 PROBLEM — I48.91 UNSPECIFIED ATRIAL FIBRILLATION: Chronic | Status: ACTIVE | Noted: 2019-11-13

## 2020-12-12 PROBLEM — R76.11 NONSPECIFIC REACTION TO TUBERCULIN SKIN TEST WITHOUT ACTIVE TUBERCULOSIS: Chronic | Status: ACTIVE | Noted: 2019-11-13

## 2020-12-12 PROBLEM — S91.109A: Chronic | Status: ACTIVE | Noted: 2019-11-13

## 2020-12-12 LAB
ANION GAP SERPL CALC-SCNC: 10 MMOL/L — SIGNIFICANT CHANGE UP (ref 5–17)
BUN SERPL-MCNC: 10 MG/DL — SIGNIFICANT CHANGE UP (ref 7–23)
CALCIUM SERPL-MCNC: 8.8 MG/DL — SIGNIFICANT CHANGE UP (ref 8.4–10.5)
CHLORIDE SERPL-SCNC: 104 MMOL/L — SIGNIFICANT CHANGE UP (ref 96–108)
CO2 SERPL-SCNC: 24 MMOL/L — SIGNIFICANT CHANGE UP (ref 22–31)
CREAT SERPL-MCNC: 0.81 MG/DL — SIGNIFICANT CHANGE UP (ref 0.5–1.3)
GLUCOSE SERPL-MCNC: 98 MG/DL — SIGNIFICANT CHANGE UP (ref 70–99)
HCT VFR BLD CALC: 38.1 % — LOW (ref 39–50)
HCV AB S/CO SERPL IA: 0.19 S/CO — SIGNIFICANT CHANGE UP (ref 0–0.99)
HCV AB SERPL-IMP: SIGNIFICANT CHANGE UP
HGB BLD-MCNC: 12.2 G/DL — LOW (ref 13–17)
MCHC RBC-ENTMCNC: 28.2 PG — SIGNIFICANT CHANGE UP (ref 27–34)
MCHC RBC-ENTMCNC: 32 GM/DL — SIGNIFICANT CHANGE UP (ref 32–36)
MCV RBC AUTO: 88.2 FL — SIGNIFICANT CHANGE UP (ref 80–100)
NRBC # BLD: 0 /100 WBCS — SIGNIFICANT CHANGE UP (ref 0–0)
PLATELET # BLD AUTO: 48 K/UL — LOW (ref 150–400)
POTASSIUM SERPL-MCNC: 4.1 MMOL/L — SIGNIFICANT CHANGE UP (ref 3.5–5.3)
POTASSIUM SERPL-SCNC: 4.1 MMOL/L — SIGNIFICANT CHANGE UP (ref 3.5–5.3)
RBC # BLD: 4.32 M/UL — SIGNIFICANT CHANGE UP (ref 4.2–5.8)
RBC # FLD: 13.1 % — SIGNIFICANT CHANGE UP (ref 10.3–14.5)
SARS-COV-2 RNA SPEC QL NAA+PROBE: SIGNIFICANT CHANGE UP
SODIUM SERPL-SCNC: 138 MMOL/L — SIGNIFICANT CHANGE UP (ref 135–145)
WBC # BLD: 8.5 K/UL — SIGNIFICANT CHANGE UP (ref 3.8–10.5)
WBC # FLD AUTO: 8.5 K/UL — SIGNIFICANT CHANGE UP (ref 3.8–10.5)

## 2020-12-12 PROCEDURE — 99223 1ST HOSP IP/OBS HIGH 75: CPT | Mod: GC

## 2020-12-12 PROCEDURE — 95720 EEG PHY/QHP EA INCR W/VEEG: CPT

## 2020-12-12 RX ORDER — ACYCLOVIR SODIUM 500 MG
2 VIAL (EA) INTRAVENOUS
Qty: 0 | Refills: 0 | DISCHARGE

## 2020-12-12 RX ORDER — VENETOCLAX 100 MG/1
20 TABLET, FILM COATED ORAL
Refills: 0 | Status: DISCONTINUED | OUTPATIENT
Start: 2020-12-12 | End: 2020-12-14

## 2020-12-12 RX ORDER — ATORVASTATIN CALCIUM 80 MG/1
80 TABLET, FILM COATED ORAL AT BEDTIME
Refills: 0 | Status: DISCONTINUED | OUTPATIENT
Start: 2020-12-12 | End: 2020-12-14

## 2020-12-12 RX ORDER — NYSTATIN CREAM 100000 [USP'U]/G
1 CREAM TOPICAL EVERY 12 HOURS
Refills: 0 | Status: DISCONTINUED | OUTPATIENT
Start: 2020-12-12 | End: 2020-12-14

## 2020-12-12 RX ORDER — LACOSAMIDE 50 MG/1
200 TABLET ORAL ONCE
Refills: 0 | Status: DISCONTINUED | OUTPATIENT
Start: 2020-12-12 | End: 2020-12-14

## 2020-12-12 RX ORDER — DILTIAZEM HCL 120 MG
1 CAPSULE, EXT RELEASE 24 HR ORAL
Qty: 0 | Refills: 0 | DISCHARGE

## 2020-12-12 RX ORDER — LEVETIRACETAM 250 MG/1
1250 TABLET, FILM COATED ORAL DAILY
Refills: 0 | Status: DISCONTINUED | OUTPATIENT
Start: 2020-12-12 | End: 2020-12-14

## 2020-12-12 RX ORDER — LEVOTHYROXINE SODIUM 125 MCG
200 TABLET ORAL
Refills: 0 | Status: DISCONTINUED | OUTPATIENT
Start: 2020-12-12 | End: 2020-12-14

## 2020-12-12 RX ORDER — PREGABALIN 225 MG/1
1000 CAPSULE ORAL DAILY
Refills: 0 | Status: DISCONTINUED | OUTPATIENT
Start: 2020-12-12 | End: 2020-12-14

## 2020-12-12 RX ORDER — INFLUENZA VIRUS VACCINE 15; 15; 15; 15 UG/.5ML; UG/.5ML; UG/.5ML; UG/.5ML
0.5 SUSPENSION INTRAMUSCULAR ONCE
Refills: 0 | Status: DISCONTINUED | OUTPATIENT
Start: 2020-12-12 | End: 2020-12-14

## 2020-12-12 RX ORDER — ERGOCALCIFEROL 1.25 MG/1
50000 CAPSULE ORAL
Refills: 0 | Status: DISCONTINUED | OUTPATIENT
Start: 2020-12-12 | End: 2020-12-14

## 2020-12-12 RX ORDER — ACYCLOVIR SODIUM 500 MG
200 VIAL (EA) INTRAVENOUS DAILY
Refills: 0 | Status: DISCONTINUED | OUTPATIENT
Start: 2020-12-12 | End: 2020-12-14

## 2020-12-12 RX ORDER — VENETOCLAX 100 MG/1
10 TABLET, FILM COATED ORAL
Refills: 0 | Status: DISCONTINUED | OUTPATIENT
Start: 2020-12-12 | End: 2020-12-12

## 2020-12-12 RX ORDER — LEVETIRACETAM 250 MG/1
1500 TABLET, FILM COATED ORAL AT BEDTIME
Refills: 0 | Status: DISCONTINUED | OUTPATIENT
Start: 2020-12-12 | End: 2020-12-14

## 2020-12-12 RX ORDER — CARVEDILOL PHOSPHATE 80 MG/1
6.25 CAPSULE, EXTENDED RELEASE ORAL EVERY 12 HOURS
Refills: 0 | Status: DISCONTINUED | OUTPATIENT
Start: 2020-12-12 | End: 2020-12-14

## 2020-12-12 RX ORDER — FERROUS SULFATE 325(65) MG
325 TABLET ORAL AT BEDTIME
Refills: 0 | Status: DISCONTINUED | OUTPATIENT
Start: 2020-12-12 | End: 2020-12-14

## 2020-12-12 RX ADMIN — ATORVASTATIN CALCIUM 80 MILLIGRAM(S): 80 TABLET, FILM COATED ORAL at 21:25

## 2020-12-12 RX ADMIN — NYSTATIN CREAM 1 APPLICATION(S): 100000 CREAM TOPICAL at 18:34

## 2020-12-12 RX ADMIN — VENETOCLAX 10 MILLIGRAM(S): 100 TABLET, FILM COATED ORAL at 21:25

## 2020-12-12 RX ADMIN — CARVEDILOL PHOSPHATE 6.25 MILLIGRAM(S): 80 CAPSULE, EXTENDED RELEASE ORAL at 06:18

## 2020-12-12 RX ADMIN — Medication 200 MILLIGRAM(S): at 12:31

## 2020-12-12 RX ADMIN — LEVETIRACETAM 1500 MILLIGRAM(S): 250 TABLET, FILM COATED ORAL at 21:25

## 2020-12-12 RX ADMIN — LEVETIRACETAM 1250 MILLIGRAM(S): 250 TABLET, FILM COATED ORAL at 12:31

## 2020-12-12 RX ADMIN — PREGABALIN 1000 MICROGRAM(S): 225 CAPSULE ORAL at 12:49

## 2020-12-12 RX ADMIN — Medication 325 MILLIGRAM(S): at 21:25

## 2020-12-12 RX ADMIN — Medication 200 MICROGRAM(S): at 08:22

## 2020-12-12 RX ADMIN — CARVEDILOL PHOSPHATE 6.25 MILLIGRAM(S): 80 CAPSULE, EXTENDED RELEASE ORAL at 17:53

## 2020-12-12 RX ADMIN — Medication 1 TABLET(S): at 12:30
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
## 2020-12-12 NOTE — H&P ADULT - NSHPOUTPATIENTPROVIDERS_GEN_ALL_CORE
PCP: Glen Guillen [phone]  CardiologistL Dr. Barrie Mullins [phone]  Oncologist: DR. Vipul Root [phone]  Neurologist: Dr. Bui [phone] PCP: Glen Guillen [phone]  Neurologist: Dr. Sergio Bui [phone]  CardiologistL Dr. Barrie Mullins [phone]  Electrophysilogst Dr. Gab Seaman [phone]  Oncologist: Dr. Vipul Root with [facility] [phone]

## 2020-12-12 NOTE — CONSULT NOTE ADULT - ASSESSMENT
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 fronto-parietal ischemic infarct.     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 etiology ? cardioembolic vs. r/o other causes   CT angio: left parietal recent, acute infarct, no flow limiting stenosis  on vEEG to r/o focal seizures. c/w Keppra 1250 AM, 1500 qHS   hx of p.afib s/p cardioversion x 3. He has been in sinus per the wife  (He was on Eliquis but taken off since prior a.fib was thought to be due to chemo)  No prior history of bleeding, despite thrombocytopenia.   EP to Interrogate loop recorder. TTE if no recent records.   May need watchman's procedure if full AC difficult and if the source is cardioembolic.   Cardiology Dr. Cordova, Dr. Rooney will be seeing. await card input.   c/w Aspirin, Lipitor. BP control. Permissive HTN  Dr. Vipul Eddy (outpt heme).  DVT ppx    - 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 fronto-parietal ischemic infarct.     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 etiology ? cardioembolic vs. r/o other causes   CT angio: left parietal recent, acute infarct, no flow limiting stenosis  no source of infection, neg UA and neg CXR. no leukocytosis/no fever.   on vEEG to r/o focal seizures. c/w Keppra 1250 AM, 1500 qHS   hx of p.afib s/p cardioversion x 3. He has been in sinus per the wife  (He was on Eliquis but taken off since prior a.fib was thought to be due to chemo)  No prior history of bleeding, despite thrombocytopenia.   EP to Interrogate loop recorder. TTE if no recent records.   May need watchman's procedure if full AC difficult and if the source is cardioembolic.   Cardiology Dr. Cordova, Dr. Rooney will be seeing. await card input.   c/w Aspirin, Lipitor. BP control. Permissive HTN  Dr. Vipul Eddy (outpt heme).  DVT ppx    - [facility]  - [phone]
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
## 2020-12-12 NOTE — H&P ADULT - ASSESSMENT
INCOMPLETE  Assessment:  75y R-handed M with h/o seizures, paroxysmal non-valvular AF s/p ablation, NHL on CT c/b thrombocytopenia, prior transient Vtach, MVP and CLL being admitted for event capture and characterization of suspected possible focal sensory seizures with preserved awareness as well as possible ESUS stroke work up.     Plan  [] vEEG   [] Continue Keppra 1250 AM, 1500 qHS  [] Rescue 2mg ativan for sz>3 min and/or any GTC. Second line lacosamide 200mg IVP.   [] Aspirin 81mg daily (trend platelets)  [] Atorvastatin 80mg daily.   [] Normotension given LKN 11/29/20   [] Fasting lipid panel, A1C    Chidi Richey, DO  PGY-3 Neurology Service INCOMPLETE  Assessment:  75y R-handed M with h/o seizures, paroxysmal non-valvular AF s/p ablation, NHL on CT c/b thrombocytopenia, prior transient Vtach, MVP and CLL being admitted for event capture and characterization of suspected possible focal sensory seizures with preserved awareness as well as possible ESUS stroke work up.     MRI Brain from [facility] reviewed: Notable DWI +, ADC - , T2 FLAIR hyperintesnity in L. pareital region which respects gyri/sulci borders, atypical for stroke like appearance.  On T2 Flair, additionally seen are similar more chronic appearing R. sided parietal hyper-intensities also respecting gyral/sucli borders.     12/11/20 NONCONTRAST HEAD CT SCAN:  1. Left parietal recent, acute infarct, correlates with findings on MRI brain 12/11/2020.  2. Additional nonacute infarcts as above.  3. No acute intracranial hemorrhage, extra-axial fluid collection or midline shift.    CT Angio Neck w/ IV Cont (12.11.20 @ 23:55)   CT ANGIOGRAPHY NECK: Patent cervical vasculature.  No hemodynamically significant carotid stenosis or flow-limiting vertebral artery stenosis.  No evidence of dissection.  CT ANGIOGRAPHY BRAIN: No vessel occlusion, flow-limiting stenosis or aneurysm is identified about the [place].    Plan  [] vEEG   [] Continue Keppra 1250 AM, 1500 qHS  [] Rescue 2mg ativan for sz>3 min and/or any GTC. Second line lacosamide 200mg IVP.   [] Aspirin 81mg daily (trend platelets)  [] Atorvastatin 80mg daily.   [] Normotension given LKN 11/29/20   [] ESUS work up.  PT had recent TTE in 09/2020 which had shown no valvular acute disease, mild LVH.    [] PT already has ILR implanted and most recently had intermittent PVCs noted but otherwise no arrhythmias as of late.  Does have h/o pAF s/p cardioversion x 3 and Vtach s/p ablation.   --->PT had previously been on Eliquis, but taken off due to thought that AF had been secondary to prior CT and that PT's current CT (Venetoclax) had significant S/E of thrombocytopenia.   [] Fasting lipid panel, A1C  [] LE dopplers to assess for DVT given hypercoagulable state and off AC.  If negative, add SCD B/L.  Trend platelets to see if/when safe to restart chemical DVT ppx.     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 parietal infarct.      LKN: No clear specific time, though symptoms became worse on 11/29/20  NIHSS: 3  Baseline MRS: 1  Not a tPA or thrombectomy candidate due to LKN time.     Semiology: L. arm elevation then beating his own chest-->L hand clonic movements which wake him up followed by an ictal cry with subsequent B/L tonic clonic convulsion lasting up to two minutes a/w hypoxia, tongue bite and post-ictal confusion.      Impression: Brief episodes of paresthesias, numbness, apraxia and poor coordination L hand >R hand due to B/L parietal dysfunction.  MRI brain notable for suspected acute L. parietal infarct and possible chronic R. parietal infarct vs ischemic changes though pattern of respecting gyri/sulci almost in cortical ribboning fashion is concerning for other etiology of lesions with DDx including ongoing focal seizure activity, PRES, infectious or autoimmune induced injury.  Suspected mechanism of stroke includes ESUS (hypercoagulable state of Ca) vs. cardioembolic 2* recurrent AF or other arrhythmia.  Combined with frequent reported sensory + phenomena lasting about 1 minute at a time, raises possibility of focal sensory seizures w/o impaired awareness.       Plan  [] vEEG   [] Continue Keppra 1250 AM, 1500 qHS for now.   [] Rescue 2mg ativan for sz>3 min and/or any GTC. Second line lacosamide 200mg IVP if no contra-indication from cardiac perspective.   [] Aspirin 81mg daily (trend platelets and discuss with stroke team regarding risk/benefit of AC as well as chemical DVT ppx)  [] Atorvastatin 80mg daily.   [] Normotension given LKN 11/29/20   [] ESUS work up.   ---->PT had recent TTE in 09/2020 which had shown no valvular acute disease, mild LVH.    ---->[] PT already has ILR implanted and most recently had intermittent PVCs noted but otherwise no arrhythmias as of late.  Does have h/o pAF s/p cardioversion x 3 and Vtach s/p ablation.   --->PT had previously been on Eliquis, but taken off due to thought that AF had been secondary to prior CT and that PT's current CT (Venetoclax) had significant S/E of thrombocytopenia.   --->MRI brain notable for acute/subacute L. parietal infarct and as well as chronic R. frontoparietal, R. anterior nelson radiata and L. cerebellar infarcts.   [] Fasting lipid panel, A1C  [] LE dopplers to assess for DVT given hypercoagulable state and off AC.  If negative, add SCD B/L.  Trend platelets to see if/when safe to restart chemical DVT ppx.   [] Tight glucose control (long-term goal HgbA1c < 6%)  [] Stroke education and counseling  [] Continuous cardiac telemetry to monitor for arrhythmia  [] Consult PT's electrophysiologist for ILR interrogation.     Stroke rehabilitation:  []Physical therapy, occupational therapy, speech therapy consults  []Consult social work and case management for help with discharge    Chidi Richey, DO  PGY-3 Neurology Service    For final recommendations, please note attending attestation below.

## 2020-12-12 NOTE — H&P ADULT - HISTORY OF PRESENT ILLNESS
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 fronto-parietal ischemic infarct.  LKN unclear, but perhaps 11/29/20.  Over past several weeks, but more notably in the last 1 week, he has had increasing numbness in B/L hands L>R, dropping items L>R, feeling like he has to think about moving his hand in specific directions.  He describes the episodes as happening about 1 minute at a time, intermittent and no specific provoking factors. 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 fronto-parietal ischemic infarct.  LKN unclear, but perhaps 11/29/20.  Over past several weeks, but more notably in the last 1 week, he has had increasing numbness in B/L hands L>R, dropping items L>R, feeling like he has to think about moving his hand in specific directions.  He describes the episodes as happening about 1 minute at a time, intermittent and no specific provoking factors.  His wife has observed him having difficulty with common every day tasks such as putting his pants on or using the phone correctly and that he has been less conversant.  PT notes no sudden onset of symptoms but gradual worsening.  He has had longstanding seizures since 1998, though wife is unclear of their origin.  He had been well controlled for several years on Keppra 1000 BID, though last had a GTC memorial day 05/2020.  Prior to this had been seizure free for 4 years.  After last GTC, Keppra had been increased to 1250 qAM and 1500 qHS by his neurologist, Dr. Bui, which he tolerated well.  Has not had sensory or other focal seizures which he is aware of.  PT and wife deny knowledge of any prior strokes.  PT has never required to be intubated for seizures, has no known allergies to AEDs and has never had SE.     Semiology: L. hand clonic movements which wake him up followed by an ictal cry with subsequent B/L tonic clonic convulsion lasting up to two minutes a/w hypoxia, tongue bite and post-ictal confusion.      Regarding his oncologic history, PT had longstanding dormant NHL.  However, in 2014, he developed an enlarged R. cervical LN which was resected and PT was started on ibrutinib.  In addition, PT underwent thyroidectomy for suspected thyroid cancer.  He continued this until 2018 with good control of his NHL but then began having episodes of paroxysmal AF.  PT denies any symptoms such as palpitations or light-headedness when in AF.  He then underwent 3 separate cardioversions to NSR.  Secondary to recurrent relapses into pAF after cardioversion, PT had ILR implanted 2018 which has not shown further AF but has noted intermittent PVCs and one 20 beat occurrence of Vtach.  Due to concern that the arrhythmia was secondary to ibrutinib he was switched to Venetoclax and has been stable on this, though he has had significant thrombocytopenia as a complication.  AS a result, PT had to be taken off Eliquis AC.  Last TTE on 10/28/20 demonstrated mild LV hypertrophy and L. chamber dilation but otherwise was reported unremarkable.  At most recent cardiology F/U, due to recurrent PVCs, PT was recommended to start coreg 6.25 BID for rate control, but PT had not yet started.    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 fronto-parietal ischemic infarct.  LKN unclear, but perhaps 11/29/20.  Over past several weeks, but more notably in the last 1 week, he has had increasing numbness in B/L hands L>R, dropping items L>R, feeling like he has to think about moving his hand in specific directions.  He describes the episodes as happening about 1 minute at a time, intermittent and no specific provoking factors.  His wife has observed him having difficulty with common every day tasks such as putting his pants on or using the phone correctly and that he has been less conversant.  PT notes no sudden onset of symptoms but gradual worsening.  He has had longstanding seizures since 1998, though wife is unclear of their origin.  He had been well controlled for several years on Keppra 1000 BID, though last had a GTC memorial day 05/2020.  Prior to this had been seizure free for 4 years.  After last GTC, Keppra had been increased to 1250 qAM and 1500 qHS by his neurologist, Dr. Bui, which he tolerated well.  Has not had sensory or other focal seizures which he is aware of.  PT and wife deny knowledge of any prior strokes.  PT has never required to be intubated for seizures, has no known allergies to AEDs and has never had SE.     Semiology: L. arm elevation then beating his own chest-->L hand clonic movements which wake him up followed by an ictal cry with subsequent B/L tonic clonic convulsion lasting up to two minutes a/w hypoxia, tongue bite and post-ictal confusion.      Regarding his oncologic history, PT had longstanding dormant small-B cell NHL (at least since 2007).   PT developed thyroid papillary CA s/p thyroidectomy 1975, stable on synthroid since.  In 2014, he developed an enlarged R. cervical LN which was resected and PT was started on ibrutinib.  In addition, PT underwent thyroidectomy for suspected thyroid cancer.  He continued this until 2018 with good control of his NHL but then began having episodes of paroxysmal AF.  PT denies any symptoms such as palpitations or light-headedness when in AF.  He then underwent 3 separate cardioversions and one time use of diltiazem and successfully converted back to NSR.  Secondary to recurrent relapses into pAF after cardioversion, PT had [employer] [id number] ILR placed 2/26/2019 which has not shown further AF but has noted intermittent PVCs and one 20 beat occurrence of Vtach.  Due to concern that the arrhythmia was secondary to ibrutinib he was switched to Venetoclax and has been stable on this, though he has had significant thrombocytopenia as a complication.  AS a result, PT had to be taken off Eliquis AC.  Last TTE on 10/28/20 demonstrated mild LV hypertrophy and L. chamber dilation but otherwise was reported unremarkable.  At most recent cardiology F/U, due to recurrent PVCs, PT was recommended to start coreg 6.25 BID for rate control, but PT had not yet started.

## 2020-12-12 NOTE — H&P ADULT - NSHPLABSRESULTS_GEN_ALL_CORE
12.1   9.67  )-----------( 49       ( 11 Dec 2020 21:03 )             38.4   12  141  |  104  |  12  ----------------------------<  88  4.1   |  26  |  0.93    Ca    8.8      11 Dec 2020 21:03    TPro  6.0  /  Alb  4.5  /  TBili  0.5  /  DBili  x   /  AST  16  /  ALT  17  /  AlkPhos  50      Urinalysis Basic - ( 11 Dec 2020 23:10 )  Color: Light Yellow / Appearance: Clear / S.007 / pH: x  Gluc: x / Ketone: Negative  / Bili: Negative / Urobili: Negative   Blood: x / Protein: Negative / Nitrite: Negative   Leuk Esterase: Negative / RBC: x / WBC x   Sq Epi: x / Non Sq Epi: x / Bacteria: x    MRI Brain from [facility] reviewed: Notable DWI +, ADC - , T2 FLAIR hyperintesnity in L. pareital region which respects gyri/sulci borders, atypical for stroke like appearance.  On T2 Flair, additionally seen are similar more chronic appearing R. sided parietal hyper-intensities also respecting gyral/sucli borders.     20 NONCONTRAST HEAD CT SCAN:  1. Left parietal recent, acute infarct, correlates with findings on MRI brain 2020.  2. Additional nonacute infarcts as above.  3. No acute intracranial hemorrhage, extra-axial fluid collection or midline shift.    CT Angio Neck w/ IV Cont (20 @ 23:55)   CT ANGIOGRAPHY NECK: Patent cervical vasculature.  No hemodynamically significant carotid stenosis or flow-limiting vertebral artery stenosis.  No evidence of dissection.  CT ANGIOGRAPHY BRAIN: No vessel occlusion, flow-limiting stenosis or aneurysm is identified about the [place]. 12.1   9.67  )-----------( 49       ( 11 Dec 2020 21:03 )             38.4   12-  141  |  104  |  12  ----------------------------<  88  4.1   |  26  |  0.93    Ca    8.8      11 Dec 2020 21:03    TPro  6.0  /  Alb  4.5  /  TBili  0.5  /  DBili  x   /  AST  16  /  ALT  17  /  AlkPhos  50  12    Urinalysis Basic - ( 11 Dec 2020 23:10 )  Color: Light Yellow / Appearance: Clear / S.007 / pH: x  Gluc: x / Ketone: Negative  / Bili: Negative / Urobili: Negative   Blood: x / Protein: Negative / Nitrite: Negative   Leuk Esterase: Negative / RBC: x / WBC x   Sq Epi: x / Non Sq Epi: x / Bacteria: x    MRI Brain from [facility] reviewed: Notable DWI +, ADC - , T2 FLAIR hyperintensity in L. parietal region which respects gyri/sulci borders.  On T2 Flair, additionally seen are similar more chronic appearing R. sided parietal hyper-intensities also respecting gyral/sucli borders.     20 NONCONTRAST HEAD CT SCAN:  1. Left parietal recent, acute infarct, correlates with findings on MRI brain 2020.  2. Additional nonacute infarcts as above.  3. No acute intracranial hemorrhage, extra-axial fluid collection or midline shift.    CT Angio Neck w/ IV Cont (20 @ 23:55)   CT ANGIOGRAPHY NECK: Patent cervical vasculature.  No hemodynamically significant carotid stenosis or flow-limiting vertebral artery stenosis.  No evidence of dissection.  CT ANGIOGRAPHY BRAIN: No vessel occlusion, flow-limiting stenosis or aneurysm is identified about the [place].

## 2020-12-12 NOTE — H&P ADULT - NSHPPHYSICALEXAM_GEN_ALL_CORE
Vital Signs Last 24 Hrs  T(C): 36.6 (12 Dec 2020 04:24), Max: 37.1 (11 Dec 2020 20:11)  T(F): 97.8 (12 Dec 2020 04:24), Max: 98.7 (11 Dec 2020 20:11)  HR: 73 (12 Dec 2020 04:24) (59 - 78)  BP: 140/65 (12 Dec 2020 04:24) (140/65 - 161/76)  BP(mean): 97 (11 Dec 2020 23:45) (97 - 97)  RR: 18 (12 Dec 2020 04:24) (16 - 19)  SpO2: 96% (12 Dec 2020 04:24) (96% - 100%)    General: Thin man sitting up in bed in NAD.   Cardiac: II/VI systolic murmur, NSR on telemetry now.   Resp: CTA throughout, breathing normally on room air.   Skin: Numerous ecchymoses in various stages of healing on hands/legs.   MSK: R foot second toe partial amputation.  Significant hammer toes B/L   Neuro:   Mental Status: Awake, alert, oriented to person, place, situation, time. Flattened affect. Follows simple one step midline commands but not crossed commands.  Has dyscalculia, inattention, L/R confusion, deficits in visuospatial reasoning (cannot copy cube properly, cannot repeat Luria hand motion sequence correctly).  Mild inattention.  Short term and long-term memory deficits.  Line bisection test with midline drawn slightly to L.  No apraxia.   Language: Speech is clear, fluent with preserved naming, repetition and comprehension.    Cranial Nerves: PERRLA (R = 3mm, L = 3mm). Visual fields intact. EOMI no nystagmus. V1-3 intact to light touch.  No facial asymmetry b/l, full eye closure strength b/l. Hearing grossly normal to conversation.  Symmetric palate elevation in midline.  Head turning & shoulder shrug intact b/l. Tongue midline, normal movements, no atrophy.  Motor: Decreased muscle bulk & normal tone.  Akathisia throughout, no startle myoclonus.  5/5 strength throughout                                                                        .	  Sensation: Symmetric B/L preserved sensation to light touch, pin prick, position.    Cortical: Extinction on double simultaneous touch of R. arm and leg.   Agraphesthesia R>L   Reflexes: 3/4 throughout.  Plantar Responses are upgoing on R, mute on L.   Coordination: Bradykinesia and difficulty with rapid alternating movements B/L.  No dysmetria on finger-to-nose.  Mild dysdiadochokinesia R>L hand.

## 2020-12-12 NOTE — EEG REPORT - NS EEG TEXT BOX
Starting: Day 1      12/12/2020      Time: 3:02:52 AM  	Duration: 4h 25m  Daily EEG Visual Analysis FINDINGS:  The background was continuous, spontaneously variable and reactive. During wakefulness, the posterior dominant rhythm consisted of symmetric, well-modulated 8 Hz activity, with amplitude to 30 uV, that attenuated to eye opening.  Low amplitude frontal beta was noted in wakefulness.  Background Slowing: No generalized background slowing was present.  Focal Slowing:  None were present.  Sleep Background: Drowsiness was characterized by fragmentation, attenuation, and slowing of the background activity.   Sleep was characterized by the presence of vertex waves, symmetric sleep spindles and K-complexes.  Other Non-Epileptiform Findings: None were present. Diffuse excess beta activity.  Interictal Epileptiform Activity:  None were present.  Events: There was a button-push event at 7:44a, patient looks alert and no EEG change noted.   Artifacts: Intermittent myogenic and movement artifacts were noted.  ECG: The heart rate on single channel ECG was predominantly between 70-80 BPM and was irregularly irregular.  AEDs: Levetiracetam 1250/1500  EEG Summary: Normal EEG in the awake, drowsy and asleep states.   Impression/Clinical Correlate: This is a normal study. No epileptiform abnormalities noted.   ________________________________________  Fox Fournier MD Epilepsy Fellow, [facility]     Carlton Mary MD PhD Director, Epilepsy Division, [facility]

## 2020-12-12 NOTE — CHART NOTE - NSCHARTNOTEFT_GEN_A_CORE
Discussed case at length with on call [facility] oncologist (associate of Dr. Vipul Eddy), Dr. Glen Grey, who has no objection for patient's venetoclax to be continued while patient is hospitalized.    [facility] Hospitalist consult placed for medical management, awaiting callback.    Discussed with housestaff, pharmacy Discussed case at length with on call [facility] oncologist (associate of Dr. Vipul Eddy), Dr. Glen Grey, who has no objection for patient's venetoclax to be continued while patient is hospitalized.    [facility] Hospitalist consult placed for medical management, awaiting callback.    Discussed with housestaff, pharmacy    ________    Update 12/12/2020    [facility] Hospitalist Dr. Carrillo called back and accepted consult, awaiting note & recommendations.    Patient's Cardiologist, Dr. Barrie Cordova, notified of patient's status, recommended loop recorder interrogation and that colleague Dr. Gab Rooney Cardiology can help with consult for any further recommendations.    Spoke with on call house Cardiology Fellow for loop recorder interrogation, at this time would not  as patient has thrombocytopenia and etiology of stroke likely cardioembolic, may be pursued Monday if patient still in hospital.    Spoke with Dr. Rooney, who will see patient 12/13 AM for Cardiology recommendations ().    Discussed patient's case at length with Stroke Attending Dr. Libman and Stroke Fellow Dr. Villarreal. L parietal infarct on MRI appears acute and given known paroxysmal atrial fibrillation, as well as chronic infarcts seen on right side of brain, mechanism most likely cardioembolic,  recommending anticoagulation when safe & appropriate for patient, as patient currently has thrombocytopenia (Plt 48,000), as well as potential for future evaluation for watchman evaluation when patient has stable and elevated platelet level for being on short term anticoagulation.    discussed above with housestaalfred, Epilepsy Dr. Mary

## 2020-12-12 NOTE — H&P ADULT - ATTENDING COMMENTS
MRI suggestive or acute and chronic embolic stroikes in left and right parietal regions, respectively. There is history suggestive of seizures, but last event was months ago. Will need loop recorder interrogation, and will d/w stroke team.
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
## 2020-12-12 NOTE — H&P ADULT - NSICDXFAMILYHX_GEN_ALL_CORE_FT
FAMILY HISTORY:  Family history of leukemia, Mother - CLL  Family history of lung cancer, father  Family history of pulmonary embolism, Mother

## 2020-12-13 ENCOUNTER — TRANSCRIPTION ENCOUNTER (OUTPATIENT)
Age: 75
End: 2020-12-13

## 2020-12-13 LAB
CULTURE RESULTS: NO GROWTH — SIGNIFICANT CHANGE UP
SPECIMEN SOURCE: SIGNIFICANT CHANGE UP

## 2020-12-13 PROCEDURE — 99223 1ST HOSP IP/OBS HIGH 75: CPT

## 2020-12-13 PROCEDURE — 99233 SBSQ HOSP IP/OBS HIGH 50: CPT | Mod: GC

## 2020-12-13 PROCEDURE — 95720 EEG PHY/QHP EA INCR W/VEEG: CPT

## 2020-12-13 PROCEDURE — 93306 TTE W/DOPPLER COMPLETE: CPT | Mod: 26

## 2020-12-13 PROCEDURE — 93010 ELECTROCARDIOGRAM REPORT: CPT

## 2020-12-13 RX ORDER — ATORVASTATIN CALCIUM 80 MG/1
1 TABLET, FILM COATED ORAL
Qty: 30 | Refills: 0
Start: 2020-12-13

## 2020-12-13 RX ADMIN — ERGOCALCIFEROL 50000 UNIT(S): 1.25 CAPSULE ORAL at 09:00

## 2020-12-13 RX ADMIN — NYSTATIN CREAM 1 APPLICATION(S): 100000 CREAM TOPICAL at 06:33

## 2020-12-13 RX ADMIN — CARVEDILOL PHOSPHATE 6.25 MILLIGRAM(S): 80 CAPSULE, EXTENDED RELEASE ORAL at 06:30

## 2020-12-13 RX ADMIN — Medication 200 MICROGRAM(S): at 09:36

## 2020-12-13 RX ADMIN — VENETOCLAX 20 MILLIGRAM(S): 100 TABLET, FILM COATED ORAL at 21:19

## 2020-12-13 RX ADMIN — Medication 325 MILLIGRAM(S): at 21:19

## 2020-12-13 RX ADMIN — Medication 1 TABLET(S): at 13:09

## 2020-12-13 RX ADMIN — LEVETIRACETAM 1250 MILLIGRAM(S): 250 TABLET, FILM COATED ORAL at 13:12

## 2020-12-13 RX ADMIN — LEVETIRACETAM 1500 MILLIGRAM(S): 250 TABLET, FILM COATED ORAL at 21:19

## 2020-12-13 RX ADMIN — NYSTATIN CREAM 1 APPLICATION(S): 100000 CREAM TOPICAL at 17:58

## 2020-12-13 RX ADMIN — CARVEDILOL PHOSPHATE 6.25 MILLIGRAM(S): 80 CAPSULE, EXTENDED RELEASE ORAL at 17:29

## 2020-12-13 RX ADMIN — ATORVASTATIN CALCIUM 80 MILLIGRAM(S): 80 TABLET, FILM COATED ORAL at 21:19

## 2020-12-13 RX ADMIN — PREGABALIN 1000 MICROGRAM(S): 225 CAPSULE ORAL at 13:08

## 2020-12-13 RX ADMIN — Medication 200 MILLIGRAM(S): at 13:08
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
## 2020-12-13 NOTE — EEG REPORT - NS EEG TEXT BOX
Starting: Day 1      12/12/2020      Time: 8:00 AM  	Duration: 24h 0m  Daily EEG Visual Analysis FINDINGS:  The background was continuous, spontaneously variable and reactive. During wakefulness, the posterior dominant rhythm consisted of symmetric, well-modulated 8 Hz activity, with amplitude to 30 uV, that attenuated to eye opening.  Low amplitude frontal beta was noted in wakefulness.  Background Slowing: No generalized background slowing was present.  Focal Slowing:  None were present.  Sleep Background: Drowsiness was characterized by fragmentation, attenuation, and slowing of the background activity.   Sleep was characterized by the presence of vertex waves, symmetric sleep spindles and K-complexes.  Other Non-Epileptiform Findings: None were present. Diffuse excess beta activity.  Interictal Epileptiform Activity:  None were present.  Events: none  Artifacts: Intermittent myogenic and movement artifacts were noted.  ECG: The heart rate on single channel ECG was predominantly between 70-80 BPM.  AEDs: Levetiracetam 1250/1500  EEG Summary: Normal EEG in the awake, drowsy and asleep states.   Impression/Clinical Correlate: This is a normal study. No epileptiform abnormalities noted.   ________________________________________      Carlton Mary MD PhD Director, Ep[facility]   Starting: Day 2   12/12/2020 8:00 AM       End: 12/13/2020 8:00 AM       Duration: 24h 0m  Daily EEG Visual Analysis FINDINGS:  The background was continuous, spontaneously variable and reactive. During wakefulness, the posterior dominant rhythm consisted of symmetric, well-modulated 8 Hz activity, with amplitude to 30 uV, that attenuated to eye opening.  Low amplitude frontal beta was noted in wakefulness.  Background Slowing: No generalized background slowing was present.  Focal Slowing:  None were present.  Sleep Background: Drowsiness was characterized by fragmentation, attenuation, and slowing of the background activity.   Sleep was characterized by the presence of vertex waves, symmetric sleep spindles and K-complexes.  Other Non-Epileptiform Findings: None were present. Diffuse excess beta activity.  Interictal Epileptiform Activity:  None were present.  Events: none  Artifacts: Intermittent myogenic and movement artifacts were noted.  ECG: The heart rate on single channel ECG was predominantly between 70-80 BPM.  AEDs: Levetiracetam 1250/1500  EEG Summary: Normal EEG in the awake, drowsy and asleep states.   Impression/Clinical Correlate: This is a normal study. No epileptiform abnormalities noted.   ________________________________________      Carlton Mary MD PhD Director, Epilepsy Division, [facility]

## 2020-12-13 NOTE — DISCHARGE NOTE PROVIDER - CARE PROVIDER_API CALL
Alejandro Lugo  NEUROLOGY  [address] [facility], [address]  [place], [place] [id number]  Phone: [phone]  Fax: [phone]  Established Patient  Follow Up Time: 1-3 days    Haile Guillen  INTERNAL MEDICINE  [address] [place] [address], [address]  [place] [address]  Phone: [phone]  Fax: [phone]  Established Patient  Follow Up Time: 1 week    Vipul Root  Phone: [phone]  Fax: (   )    -  Established Patient  Follow Up Time: 1 week    Dr. Barrie  Phone: [phone]  Fax: (   )    -  Established Patient  Follow Up Time: 1 week    Gianfranco Seaman  Phone: [phone]  Fax: (   )    -  Established Patient  Follow Up Time: 1 week   Alejandro Lugo  NEUROLOGY  [address] [facility], [address]  [place], [place] [id number]  Phone: [phone]  Fax: [phone]  Established Patient  Follow Up Time: 1-3 days    Haile Guillen  INTERNAL MEDICINE  [address] [place] [address] [address]  [place] [address]  Phone: [phone]  Fax: [phone]  Established Patient  Follow Up Time: 1 week    Vipul Root  Phone: [phone]  Fax: (   )    -  Established Patient  Follow Up Time: 1 week    Dr. Barrie  Phone: [phone]  Fax: (   )    -  Established Patient  Follow Up Time: 1 week    Gianfranco Seaman  Phone: [phone]  Fax: (   )    -  Established Patient  Follow Up Time: 1 week    Eric Morales)  Neurology; Vascular Neurology  [address] [facility], [address]  [place] [address]  Phone: [phone]  Fax: [phone]  Follow Up Time:

## 2020-12-13 NOTE — DISCHARGE NOTE PROVIDER - NSDCMRMEDTOKEN_GEN_ALL_CORE_FT
acyclovir 200 mg oral capsule: 1 cap(s) orally once a day  atorvastatin 80 mg oral tablet: 1 tab(s) orally once a day (at bedtime)  Coreg 6.25 mg oral tablet: 1 tab(s) orally 2 times a day  ferrous sulfate 325 mg (65 mg elemental iron) oral tablet: 1 tab(s) orally once a day (at bedtime)  Keppra 500 mg oral tablet: 3 tab(s) orally once a day (at bedtime)  levETIRAcetam 500 mg oral tablet: 2.5 tab(s) orally once a day in AM  levothyroxine 200 mcg (0.2 mg) oral tablet: 1 tab(s) orally 5 times a week -  Sun, Tues, Weds, Thurs, Sat  venetoclax 10 mg oral tablet: 2 tab(s) orally once a day  Vitamin B-100 oral tablet: 1 tab(s) orally once a day  Vitamin B12: 1000 microgram(s) sublingual once a day  Vitamin C 1000 mg oral tablet: 1 tab(s) orally 2 times a day  Vitamin D3 50,000 intl units (1250 mcg) oral capsule: 1 tab(s) orally once a week on sundays   acyclovir 200 mg oral capsule: 1 cap(s) orally once a day  atorvastatin 20 mg oral tablet: 1 tab(s) orally once a day (at bedtime)   Coreg 6.25 mg oral tablet: 1 tab(s) orally 2 times a day  ferrous sulfate 325 mg (65 mg elemental iron) oral tablet: 1 tab(s) orally once a day (at bedtime)  Keppra 500 mg oral tablet: 3 tab(s) orally once a day (at bedtime)  levETIRAcetam 500 mg oral tablet: 2.5 tab(s) orally once a day in AM  levothyroxine 200 mcg (0.2 mg) oral tablet: 1 tab(s) orally 5 times a week -  Sun, Tues, Weds, Thurs, Sat  venetoclax 10 mg oral tablet: 2 tab(s) orally once a day  Vitamin B-100 oral tablet: 1 tab(s) orally once a day  Vitamin B12: 1000 microgram(s) sublingual once a day  Vitamin C 1000 mg oral tablet: 1 tab(s) orally 2 times a day  Vitamin D3 50,000 intl units (1250 mcg) oral capsule: 1 tab(s) orally once a week on sundays

## 2020-12-13 NOTE — CONSULT NOTE ADULT - SUBJECTIVE AND OBJECTIVE BOX
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 fronto-parietal ischemic infarct.  LKN unclear, but perhaps 11/29/20.  Over past several weeks, but more notably in the last 1 week, he has had increasing numbness in B/L hands L>R, dropping items L>R, feeling like he has to think about moving his hand in specific directions.  He describes the episodes as happening about 1 minute at a time, intermittent and no specific provoking factors.  His wife has observed him having difficulty with common every day tasks such as putting his pants on or using the phone correctly and that he has been less conversant.  PT notes no sudden onset of symptoms but gradual worsening.  He has had longstanding seizures since 1998, though wife is unclear of their origin.  He had been well controlled for several years on Keppra 1000 BID, though last had a GTC memorial day 05/2020.  Prior to this had been seizure free for 4 years.  After last GTC, Keppra had been increased to 1250 qAM and 1500 qHS by his neurologist, Dr. Bui, which he tolerated well.  Has not had sensory or other focal seizures which he is aware of.  PT and wife deny knowledge of any prior strokes.  PT has never required to be intubated for seizures, has no known allergies to AEDs and has never had SE.   Regarding his oncologic history, PT had longstanding dormant small-B cell NHL (at least since 2007).   PT developed thyroid papillary CA s/p thyroidectomy 1975, stable on synthroid since.  In 2014, he developed an enlarged R. cervical LN which was resected and PT was started on ibrutinib.  In addition, PT underwent thyroidectomy for suspected thyroid cancer.  He continued this until 2018 with good control of his NHL but then began having episodes of paroxysmal AF.  PT denies any symptoms such as palpitations or light-headedness when in AF.  He then underwent 3 separate cardioversions and one time use of diltiazem and successfully converted back to NSR.  Secondary to recurrent relapses into pAF after cardioversion, PT had [employer] [id number] ILR placed 2/26/2019 which has not shown further AF but has noted intermittent PVCs and one 20 beat occurrence of Vtach.  Due to concern that the arrhythmia was secondary to ibrutinib he was switched to Venetoclax and has been stable on this, though he has had significant thrombocytopenia as a complication.  AS a result, PT had to be taken off Eliquis AC.  Last TTE on 10/28/20 demonstrated mild LV hypertrophy and L. chamber dilation but otherwise was reported unremarkable    Patient denies sob or cp or palitations  No ecg on chart but appears to be in NSR with premature beats  MEDICATIONS:  MEDICATIONS  (STANDING):  acyclovir   Oral Tab/Cap 200 milliGRAM(s) Oral daily  atorvastatin 80 milliGRAM(s) Oral at bedtime  carvedilol 6.25 milliGRAM(s) Oral every 12 hours  cyanocobalamin 1000 MICROGram(s) Oral daily  ergocalciferol 04646 Unit(s) Oral <User Schedule>  ferrous    sulfate 325 milliGRAM(s) Oral at bedtime  influenza   Vaccine 0.5 milliLiter(s) IntraMuscular once  levETIRAcetam 1250 milliGRAM(s) Oral daily  levETIRAcetam 1500 milliGRAM(s) Oral at bedtime  levothyroxine 200 MICROGram(s) Oral <User Schedule>  nystatin Cream 1 Application(s) Topical every 12 hours  venetoclax 20 milliGRAM(s) Oral <User Schedule>  vitamin B complex with vitamin C 1 Tablet(s) Oral daily      PHYSICAL EXAM:  T(C): 36.6 (12-13-20 @ 08:40), Max: 37.1 (12-12-20 @ 16:20)  HR: 63 (12-13-20 @ 08:40) (63 - 95)  BP: 102/68 (12-13-20 @ 08:40) (102/68 - 146/82)  RR: 18 (12-13-20 @ 08:40) (18 - 18)  SpO2: 95% (12-13-20 @ 08:40) (95% - 97%)  Wt(kg): --  I&O's Summary        Appearance: Normal	looks well NAD  HEENT:   Normal oral mucosa, PERRL, EOMI	  Cardiovascular: Normal S1 S2, No JVD, No murmurs ,occasional premature beat  Respiratory: Lungs clear to auscultation, normal effort 	  Gastrointestinal:  Soft, Non-tender, + BS	  Skin: No rashes, No ecchymoses, No cyanosis, warm to touch  Musculoskeletal: Normal range of motion, normal strength  Psychiatry:  Mood & affect appropriate  Ext: No edema  Peripheral pulses palpable 2+ bilaterally      LABS:    CARDIAC MARKERS:                                12.2   8.50  )-----------( 48       ( 12 Dec 2020 07:50 )             38.1     12-12    138  |  104  |  10  ----------------------------<  98  4.1   |  24  |  0.81    Ca    8.8      12 Dec 2020 07:50    TPro  6.0  /  Alb  4.5  /  TBili  0.5  /  DBili  x   /  AST  16  /  ALT  17  /  AlkPhos  50  12-11    proBNP:   Lipid Profile:   HgA1c:   TSH:
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
## 2020-12-13 NOTE — PROGRESS NOTE ADULT - ASSESSMENT
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 fronto-parietal ischemic infarct.     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 etiology ? cardioembolic vs. r/o other causes   CT angio: left parietal recent, acute infarct, no flow limiting stenosis  no source of infection, neg UA and neg CXR. no leukocytosis/no fever.   vEEG neg for focal seizures. c/w Keppra 1250 AM, 1500 qHS   hx of p.afib s/p cardioversion x 3. He has been in sinus per the wife.  (He was on Eliquis but taken off since prior a.fib was thought to be due to chemo)  No prior history of bleeding, despite thrombocytopenia.   s/p EP eval and Interrogation of loop recorder, no definitive a.fib. noted PVC/PACs.  TTE with normal LV  no urgent need for watchman's procedure per EP  Cardiology Dr. Cordova, Dr. Rooney seeing here.  c/w Aspirin, Lipitor. BP control. Permissive HTN  Dr. Vipul Eddy (outpt heme).  DVT ppx    - Dr. MCGOVERN Htet ([facility])  - [phone]

## 2020-12-13 NOTE — CONSULT NOTE ADULT - ASSESSMENT
1. hx of PAF recent ventriclar arrythmias  2. thrombocytopenia  3. acute CVA ? embolic  4. noc ardiac symptoms    Recommend  call EP to check LINK if any arrythmia  complete neuro evaluation  heme/onc followup re: thrombocytopenia  ECG 12 lead and repeat echo  if CVA felt to be embolic and or if there is evidence of afib would call EP to evaluate for watchmnan

## 2020-12-13 NOTE — DISCHARGE NOTE PROVIDER - HOSPITAL COURSE
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 fronto-parietal ischemic infarct.  LKN unclear, but perhaps 11/29/20.  Over past several weeks, but more notably in the last 1 week, he has had increasing numbness in B/L hands L>R, dropping items L>R, feeling like he has to think about moving his hand in specific directions.  He describes the episodes as happening about 1 minute at a time, intermittent and no specific provoking factors.  His wife has observed him having difficulty with common every day tasks such as putting his pants on or using the phone correctly and that he has been less conversant.  PT notes no sudden onset of symptoms but gradual worsening.  He has had longstanding seizures since 1998, though wife is unclear of their origin.  He had been well controlled for several years on Keppra 1000 BID, though last had a GTC memorial day 05/2020.  Prior to this had been seizure free for 4 years.  After last GTC, Keppra had been increased to 1250 qAM and 1500 qHS by his neurologist, Dr. Bui, which he tolerated well.  Has not had sensory or other focal seizures which he is aware of.  PT and wife deny knowledge of any prior strokes.  PT has never required to be intubated for seizures, has no known allergies to AEDs and has never had SE.     Semiology: L. arm elevation then beating his own chest-->L hand clonic movements which wake him up followed by an ictal cry with subsequent B/L tonic clonic convulsion lasting up to two minutes a/w hypoxia, tongue bite and post-ictal confusion.      Regarding his oncologic history, PT had longstanding dormant small-B cell NHL (at least since 2007).   PT developed thyroid papillary CA s/p thyroidectomy 1975, stable on synthroid since.  In 2014, he developed an enlarged R. cervical LN which was resected and PT was started on ibrutinib.  In addition, PT underwent thyroidectomy for suspected thyroid cancer.  He continued this until 2018 with good control of his NHL but then began having episodes of paroxysmal AF.  PT denies any symptoms such as palpitations or light-headedness when in AF.  He then underwent 3 separate cardioversions and one time use of diltiazem and successfully converted back to NSR.  Secondary to recurrent relapses into pAF after cardioversion, PT had Medtronic LNQ11 ILR placed 2/26/2019 which has not shown further AF but has noted intermittent PVCs and one 20 beat occurrence of Vtach.  Due to concern that the arrhythmia was secondary to ibrutinib he was switched to Venetoclax and has been stable on this, though he has had significant thrombocytopenia as a complication.  AS a result, PT had to be taken off Eliquis AC.  Last TTE on 10/28/20 demonstrated mild LV hypertrophy and L. chamber dilation but otherwise was reported unremarkable.  At most recent cardiology F/U, due to recurrent PVCs, PT was recommended to start coreg 6.25 BID for rate control, but PT had not yet started.    (12 Dec 2020 00:05)      Imaging:  CTH w/o contrast, CT Angio Head/Neck w/ IV Cont (12.11.20)    IMPRESSION:  NONCONTRAST HEAD CT SCAN:  1. Left parietal recent, acute infarct, correlates with findings on MRI brain 12/11/2020.  2. Additional nonacute infarcts as above.  3. No acute intracranial hemorrhage, extra-axial fluid collection or midline shift.    CT ANGIOGRAPHY NECK: Patent cervical vasculature.  No hemodynamically significant carotid stenosis or flow-limiting vertebral artery stenosis.  No evidence of dissection.    CT ANGIOGRAPHY BRAIN: No vessel occlusion, flow-limiting stenosis or aneurysm is identified about the [place].      TTE shows ___________________    Hospital course:  VEEG monitoring did not show any seizure activity. Patient was started on atorvastatin 80 mg nightly considering acute left parietal infarct. Antiplatelets held and anticoagulation deferred due to thrombocytopenia.   Electrophysiology consulted, recommended deferring anticoagulation and did not recommend a Watchman at this time.   Cardiology consulted, recommended heme/onc follow up and EKG/echocardiogram.  ILR was interrogated on 12/13 showing:   "(records from 11/20 onwards), and there are mult eps of irregularly irregular heart rate  -Despite this, pt's baseline rhythm is NSR w/ freq APCs and VPCs  -His recorded eps are not far from baseline rates, and irregular beats are likely APCs/VPCs in context of sinus rhythm  -AF cannot be definitely r/o, but is lower on ddx"    Impression: Acute left parietal ischemic infarct likely cardioembolic. No seizures captured.     Plan:  Start anticoagulation when no contraindication  Continue current medications  Outpatient stroke neurology follow up ([phone], Dr. Lugo/Carmen)  Outpatient primary care follow up (Dr. Guillen)  Outpatient cardiology follow up (Dr. Sood)  Outpatient oncology follow up (Dr. 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 fronto-parietal ischemic infarct.  LKN unclear, but perhaps 11/29/20.  Over past several weeks, but more notably in the last 1 week, he has had increasing numbness in B/L hands L>R, dropping items L>R, feeling like he has to think about moving his hand in specific directions.  He describes the episodes as happening about 1 minute at a time, intermittent and no specific provoking factors.  His wife has observed him having difficulty with common every day tasks such as putting his pants on or using the phone correctly and that he has been less conversant.  PT notes no sudden onset of symptoms but gradual worsening.  He has had longstanding seizures since 1998, though wife is unclear of their origin.  He had been well controlled for several years on Keppra 1000 BID, though last had a GTC memorial day 05/2020.  Prior to this had been seizure free for 4 years.  After last GTC, Keppra had been increased to 1250 qAM and 1500 qHS by his neurologist, Dr. Bui, which he tolerated well.  Has not had sensory or other focal seizures which he is aware of.  PT and wife deny knowledge of any prior strokes.  PT has never required to be intubated for seizures, has no known allergies to AEDs and has never had SE.     Semiology: L. arm elevation then beating his own chest-->L hand clonic movements which wake him up followed by an ictal cry with subsequent B/L tonic clonic convulsion lasting up to two minutes a/w hypoxia, tongue bite and post-ictal confusion.      Regarding his oncologic history, PT had longstanding dormant small-B cell NHL (at least since 2007).   PT developed thyroid papillary CA s/p thyroidectomy 1975, stable on synthroid since.  In 2014, he developed an enlarged R. cervical LN which was resected and PT was started on ibrutinib.  In addition, PT underwent thyroidectomy for suspected thyroid cancer.  He continued this until 2018 with good control of his NHL but then began having episodes of paroxysmal AF.  PT denies any symptoms such as palpitations or light-headedness when in AF.  He then underwent 3 separate cardioversions and one time use of diltiazem and successfully converted back to NSR.  Secondary to recurrent relapses into pAF after cardioversion, PT had Medtronic LNQ11 ILR placed 2/26/2019 which has not shown further AF but has noted intermittent PVCs and one 20 beat occurrence of Vtach.  Due to concern that the arrhythmia was secondary to ibrutinib he was switched to Venetoclax and has been stable on this, though he has had significant thrombocytopenia as a complication.  AS a result, PT had to be taken off Eliquis AC.  Last TTE on 10/28/20 demonstrated mild LV hypertrophy and L. chamber dilation but otherwise was reported unremarkable.  At most recent cardiology F/U, due to recurrent PVCs, PT was recommended to start coreg 6.25 BID for rate control, but PT had not yet started.    (12 Dec 2020 00:05)      Imaging:  CTH w/o contrast, CT Angio Head/Neck w/ IV Cont (12.11.20)    IMPRESSION:  NONCONTRAST HEAD CT SCAN:  1. Left parietal recent, acute infarct, correlates with findings on MRI brain 12/11/2020.  2. Additional nonacute infarcts as above.  3. No acute intracranial hemorrhage, extra-axial fluid collection or midline shift.    CT ANGIOGRAPHY NECK: Patent cervical vasculature.  No hemodynamically significant carotid stenosis or flow-limiting vertebral artery stenosis.  No evidence of dissection.    CT ANGIOGRAPHY BRAIN: No vessel occlusion, flow-limiting stenosis or aneurysm is identified about the [place].      TTE with Doppler (w/Cont) (12.13.20)  Conclusions:  1. Normal left ventricular internal dimensions and wall  thicknesses.  2. Normal left ventricular systolic function. No segmental  wall motion abnormalities.   Endocardial visualization  enhanced with intravenous injection of Ultrasonic Enhancing  Agent (Definity).  3. Estimated pulmonary artery systolic pressure equals 37  mm Hg, assuming right atrial pressure equals 8 mm Hg,  consistent with borderline pulmonary pressures.    *** No previous Echo exam.    Hospital course:  VEEG monitoring did not show any seizure activity. Patient was started on atorvastatin 80 mg nightly considering acute left parietal infarct. Antiplatelets held and anticoagulation deferred due to thrombocytopenia.   Electrophysiology consulted, recommended deferring anticoagulation and did not recommend a Watchman at this time.   Cardiology consulted, recommended heme/onc follow up and EKG/echocardiogram.  ILR was interrogated on 12/13 showing:   "(records from 11/20 onwards), and there are mult eps of irregularly irregular heart rate  -Despite this, pt's baseline rhythm is NSR w/ freq APCs and VPCs  -His recorded eps are not far from baseline rates, and irregular beats are likely APCs/VPCs in context of sinus rhythm  -AF cannot be definitely r/o, but is lower on ddx"    Impression: Acute left parietal ischemic infarct likely cardioembolic. No seizures captured.     Plan:  Start anticoagulation when no contraindication  Continue current medications  Outpatient stroke neurology follow up ([phone], Dr. Lugo/Carmen)  Outpatient primary care follow up (Dr. Guillen)  Outpatient cardiology follow up (Dr. Sood)  Outpatient oncology follow up (Dr. 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 fronto-parietal ischemic infarct.  LKN unclear, but perhaps 11/29/20.  Over past several weeks, but more notably in the last 1 week, he has had increasing numbness in B/L hands L>R, dropping items L>R, feeling like he has to think about moving his hand in specific directions.  He describes the episodes as happening about 1 minute at a time, intermittent and no specific provoking factors.  His wife has observed him having difficulty with common every day tasks such as putting his pants on or using the phone correctly and that he has been less conversant.  PT notes no sudden onset of symptoms but gradual worsening.  He has had longstanding seizures since 1998, though wife is unclear of their origin.  He had been well controlled for several years on Keppra 1000 BID, though last had a GTC memorial day 05/2020.  Prior to this had been seizure free for 4 years.  After last GTC, Keppra had been increased to 1250 qAM and 1500 qHS by his neurologist, Dr. Bui, which he tolerated well.  Has not had sensory or other focal seizures which he is aware of.  PT and wife deny knowledge of any prior strokes.  PT has never required to be intubated for seizures, has no known allergies to AEDs and has never had SE.     Semiology: L. arm elevation then beating his own chest-->L hand clonic movements which wake him up followed by an ictal cry with subsequent B/L tonic clonic convulsion lasting up to two minutes a/w hypoxia, tongue bite and post-ictal confusion.      Regarding his oncologic history, PT had longstanding dormant small-B cell NHL (at least since 2007).   PT developed thyroid papillary CA s/p thyroidectomy 1975, stable on synthroid since.  In 2014, he developed an enlarged R. cervical LN which was resected and PT was started on ibrutinib.  In addition, PT underwent thyroidectomy for suspected thyroid cancer.  He continued this until 2018 with good control of his NHL but then began having episodes of paroxysmal AF.  PT denies any symptoms such as palpitations or light-headedness when in AF.  He then underwent 3 separate cardioversions and one time use of diltiazem and successfully converted back to NSR.  Secondary to recurrent relapses into pAF after cardioversion, PT had Medtronic LNQ11 ILR placed 2/26/2019 which has not shown further AF but has noted intermittent PVCs and one 20 beat occurrence of Vtach.  Due to concern that the arrhythmia was secondary to ibrutinib he was switched to Venetoclax and has been stable on this, though he has had significant thrombocytopenia as a complication.  AS a result, PT had to be taken off Eliquis AC.  Last TTE on 10/28/20 demonstrated mild LV hypertrophy and L. chamber dilation but otherwise was reported unremarkable.  At most recent cardiology F/U, due to recurrent PVCs, PT was recommended to start coreg 6.25 BID for rate control, but PT had not yet started.    (12 Dec 2020 00:05)      Imaging:  CTH w/o contrast, CT Angio Head/Neck w/ IV Cont (12.11.20)    IMPRESSION:  NONCONTRAST HEAD CT SCAN:  1. Left parietal recent, acute infarct, correlates with findings on MRI brain 12/11/2020.  2. Additional nonacute infarcts as above.  3. No acute intracranial hemorrhage, extra-axial fluid collection or midline shift.    CT ANGIOGRAPHY NECK: Patent cervical vasculature.  No hemodynamically significant carotid stenosis or flow-limiting vertebral artery stenosis.  No evidence of dissection.    CT ANGIOGRAPHY BRAIN: No vessel occlusion, flow-limiting stenosis or aneurysm is identified about the [place].      TTE with Doppler (w/Cont) (12.13.20)  Conclusions:  1. Normal left ventricular internal dimensions and wall  thicknesses.  2. Normal left ventricular systolic function. No segmental  wall motion abnormalities.   Endocardial visualization  enhanced with intravenous injection of Ultrasonic Enhancing  Agent (Definity).  3. Estimated pulmonary artery systolic pressure equals 37  mm Hg, assuming right atrial pressure equals 8 mm Hg,  consistent with borderline pulmonary pressures.    *** No previous Echo exam.    Hospital course:  VEEG monitoring did not show any seizure activity. Patient was started on atorvastatin 80 mg nightly considering acute left parietal infarct. Antiplatelets held and anticoagulation deferred due to thrombocytopenia.   Electrophysiology consulted, recommended deferring anticoagulation and did not recommend a Watchman at this time.   Cardiology consulted, recommended heme/onc follow up and EKG/echocardiogram.  ILR was interrogated on 12/13 showing:   "(records from 11/20 onwards), and there are mult eps of irregularly irregular heart rate  -Despite this, pt's baseline rhythm is NSR w/ freq APCs and VPCs  -His recorded eps are not far from baseline rates, and irregular beats are likely APCs/VPCs in context of sinus rhythm  -AF cannot be definitely r/o, but is lower on ddx"    Impression: Acute left parietal ischemic infarct likely cardioembolic. No seizures captured.     Plan:  Start anticoagulation when no contraindication per outpatient Cardio/Onc  Continue current medications  Outpatient stroke neurology follow up ([phone], Dr. Lugo/Carmen)  Outpatient primary care follow up (Dr. Guillen)  Outpatient cardiology follow up (Dr. Sood)  Outpatient oncology follow up (Dr. 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 fronto-parietal ischemic infarct.  LKN unclear, but perhaps 11/29/20.  Over past several weeks, but more notably in the last 1 week, he has had increasing numbness in B/L hands L>R, dropping items L>R, feeling like he has to think about moving his hand in specific directions.  He describes the episodes as happening about 1 minute at a time, intermittent and no specific provoking factors.  His wife has observed him having difficulty with common every day tasks such as putting his pants on or using the phone correctly and that he has been less conversant.  PT notes no sudden onset of symptoms but gradual worsening.  He has had longstanding seizures since 1998, though wife is unclear of their origin.  He had been well controlled for several years on Keppra 1000 BID, though last had a GTC memorial day 05/2020.  Prior to this had been seizure free for 4 years.  After last GTC, Keppra had been increased to 1250 qAM and 1500 qHS by his neurologist, Dr. Bui, which he tolerated well.  Has not had sensory or other focal seizures which he is aware of.  PT and wife deny knowledge of any prior strokes.  PT has never required to be intubated for seizures, has no known allergies to AEDs and has never had SE.     Semiology: L. arm elevation then beating his own chest-->L hand clonic movements which wake him up followed by an ictal cry with subsequent B/L tonic clonic convulsion lasting up to two minutes a/w hypoxia, tongue bite and post-ictal confusion.      Regarding his oncologic history, PT had longstanding dormant small-B cell NHL (at least since 2007).   PT developed thyroid papillary CA s/p thyroidectomy 1975, stable on synthroid since.  In 2014, he developed an enlarged R. cervical LN which was resected and PT was started on ibrutinib.  In addition, PT underwent thyroidectomy for suspected thyroid cancer.  He continued this until 2018 with good control of his NHL but then began having episodes of paroxysmal AF.  PT denies any symptoms such as palpitations or light-headedness when in AF.  He then underwent 3 separate cardioversions and one time use of diltiazem and successfully converted back to NSR.  Secondary to recurrent relapses into pAF after cardioversion, PT had Medtronic LNQ11 ILR placed 2/26/2019 which has not shown further AF but has noted intermittent PVCs and one 20 beat occurrence of Vtach.  Due to concern that the arrhythmia was secondary to ibrutinib he was switched to Venetoclax and has been stable on this, though he has had significant thrombocytopenia as a complication.  AS a result, PT had to be taken off Eliquis AC.  Last TTE on 10/28/20 demonstrated mild LV hypertrophy and L. chamber dilation but otherwise was reported unremarkable.  At most recent cardiology F/U, due to recurrent PVCs, PT was recommended to start coreg 6.25 BID for rate control, but PT had not yet started.    (12 Dec 2020 00:05)      Imaging:  CTH w/o contrast, CT Angio Head/Neck w/ IV Cont (12.11.20)    IMPRESSION:  NONCONTRAST HEAD CT SCAN:  1. Left parietal recent, acute infarct, correlates with findings on MRI brain 12/11/2020.  2. Additional nonacute infarcts as above.  3. No acute intracranial hemorrhage, extra-axial fluid collection or midline shift.    CT ANGIOGRAPHY NECK: Patent cervical vasculature.  No hemodynamically significant carotid stenosis or flow-limiting vertebral artery stenosis.  No evidence of dissection.    CT ANGIOGRAPHY BRAIN: No vessel occlusion, flow-limiting stenosis or aneurysm is identified about the [place].      TTE with Doppler (w/Cont) (12.13.20)  Conclusions:  1. Normal left ventricular internal dimensions and wall  thicknesses.  2. Normal left ventricular systolic function. No segmental  wall motion abnormalities.   Endocardial visualization  enhanced with intravenous injection of Ultrasonic Enhancing  Agent (Definity).  3. Estimated pulmonary artery systolic pressure equals 37  mm Hg, assuming right atrial pressure equals 8 mm Hg,  consistent with borderline pulmonary pressures.    *** No previous Echo exam.    Hospital course:  VEEG monitoring did not show any seizure activity. Patient was started on atorvastatin 80 mg nightly considering acute left parietal infarct. Antiplatelets held and anticoagulation deferred due to thrombocytopenia.   Electrophysiology consulted, recommended deferring anticoagulation and did not recommend a Watchman at this time.   Cardiology consulted, recommended heme/onc follow up and EKG/echocardiogram.  ILR was interrogated on 12/13 showing:   "(records from 11/20 onwards), and there are mult eps of irregularly irregular heart rate  -Despite this, pt's baseline rhythm is NSR w/ freq APCs and VPCs  -His recorded eps are not far from baseline rates, and irregular beats are likely APCs/VPCs in context of sinus rhythm  -AF cannot be definitely r/o, but is lower on ddx"  - dopplers performed to r/o DVT and was negative    Impression: Acute left parietal ischemic infarct likely cardioembolic. No seizures captured.     Plan:  Start anticoagulation when no contraindication per outpatient Cardio/Onc  Continue current medications  Outpatient stroke neurology follow up ([phone], Dr. Lugo/Carmen)  Outpatient primary care follow up (Dr. Guillen)  Outpatient cardiology follow up (Dr. Sood)  Outpatient oncology follow up (Dr. Root)

## 2020-12-13 NOTE — DISCHARGE NOTE PROVIDER - NSDCQMPCI_CARD_ALL_CORE
No

## 2020-12-13 NOTE — PROGRESS NOTE ADULT - ASSESSMENT
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 parietal infarct.      LKN: No clear specific time, though symptoms became worse on 11/29/20  NIHSS: 3  Baseline MRS: 1  Not a tPA or thrombectomy candidate due to LKN time.     Semiology: L. arm elevation then beating his own chest-->L hand clonic movements which wake him up followed by an ictal cry with subsequent B/L tonic clonic convulsion lasting up to two minutes a/w hypoxia, tongue bite and post-ictal confusion.      Impression: Brief episodes of paresthesias, numbness, apraxia and poor coordination L hand >R hand due to B/L parietal dysfunction.  MRI brain notable for suspected acute L. parietal infarct and possible chronic R. parietal infarct vs ischemic changes though pattern of respecting gyri/sulci almost in cortical ribboning fashion is concerning for other etiology of lesions with DDx including ongoing focal seizure activity, PRES, infectious or autoimmune induced injury.  Suspected mechanism of stroke includes ESUS (hypercoagulable state of Ca) vs. cardioembolic 2* recurrent AF or other arrhythmia.  Combined with frequent reported sensory + phenomena lasting about 1 minute at a time, raises possibility of focal sensory seizures w/o impaired awareness.       Plan  [] vEEG   [] LE dopplers to assess for DVT given hypercoagulable state and off AC.  If negative, add SCD B/L.  Trend platelets to see if/when safe to restart chemical DVT ppx.   [] Continue Keppra 1250 AM, 1500 qHS for now.   [] Rescue 2mg ativan for sz>3 min and/or any GTC. Second line lacosamide 200mg IVP if no contra-indication from cardiac perspective.   [] Atorvastatin 80mg daily.   [] Fasting lipid panel, A1C  [] PT/OT, S&S    Case to be discussed with epilepsy attending, Dr. 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 parietal infarct.      LKN: No clear specific time, though symptoms became worse on 11/29/20  NIHSS: 3  Baseline MRS: 1  Not a tPA or thrombectomy candidate due to LKN time.     Semiology: L. arm elevation then beating his own chest-->L hand clonic movements which wake him up followed by an ictal cry with subsequent B/L tonic clonic convulsion lasting up to two minutes a/w hypoxia, tongue bite and post-ictal confusion.      Impression: Brief episodes of paresthesias, numbness, apraxia and poor coordination L hand >R hand due to B/L parietal dysfunction.  MRI brain notable for suspected acute L. parietal infarct and possible chronic R. parietal infarct vs ischemic changes though pattern of respecting gyri/sulci almost in cortical ribboning fashion is concerning for other etiology of lesions with DDx including ongoing focal seizure activity, PRES, infectious or autoimmune induced injury.  Suspected mechanism of stroke includes ESUS (hypercoagulable state of Ca) vs. cardioembolic 2* recurrent AF or other arrhythmia.  Combined with frequent reported sensory + phenomena lasting about 1 minute at a time, raises possibility of focal sensory seizures w/o impaired awareness.       Plan  [] vEEG   [] LE dopplers to assess for DVT given hypercoagulable state and off AC.  If negative, add SCD B/L.  Trend platelets to see if/when safe to restart chemical DVT ppx.   [] Continue Keppra 1250 AM, 1500 qHS for now.   [] Rescue 2mg ativan for sz>3 min and/or any GTC. Second line lacosamide 200mg IVP if no contra-indication from cardiac perspective.   [] Atorvastatin 80mg daily.   [] Fasting lipid panel, A1C  [] PT/OT, S&S  [] EP consult placed for possible Watchman and ILR interrogation    Case to be discussed with epilepsy attending, Dr. 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 parietal infarct.      LKN: No clear specific time, though symptoms became worse on 11/29/20  NIHSS: 3  Baseline MRS: 1  Not a tPA or thrombectomy candidate due to LKN time.     Semiology: L. arm elevation then beating his own chest-->L hand clonic movements which wake him up followed by an ictal cry with subsequent B/L tonic clonic convulsion lasting up to two minutes a/w hypoxia, tongue bite and post-ictal confusion.      Impression: Brief episodes of paresthesias, numbness, apraxia and poor coordination L hand >R hand due to B/L parietal dysfunction.  MRI brain notable for suspected acute L. parietal infarct and possible chronic R. parietal infarct vs ischemic changes though pattern of respecting gyri/sulci almost in cortical ribboning fashion is concerning for other etiology of lesions with DDx including ongoing focal seizure activity, PRES, infectious or autoimmune induced injury.  Suspected mechanism of stroke includes ESUS (hypercoagulable state of Ca) vs. cardioembolic 2* recurrent AF or other arrhythmia.  Combined with frequent reported sensory + phenomena lasting about 1 minute at a time, raises possibility of focal sensory seizures w/o impaired awareness.       Plan  Imaging/Studies:  [] vEEG   [] TTE   [] LE dopplers to assess for DVT given hypercoagulable state and off AC.  If negative, add SCD B/L.  Trend platelets to see if/when safe to restart chemical DVT ppx.   Medications:  [] Continue Keppra 1250 AM, 1500 qHS for now.   [] Rescue 2mg ativan for sz>3 min and/or any GTC. Second line lacosamide 200mg IVP if no contra-indication from cardiac perspective.   [] Atorvastatin 80mg daily.   Labs:  [] Fasting lipid panel, A1C  Other:  [] PT/OT, S&S  [] Cardiology recommendations appreciated  [] EP consult placed for possible Watchman and ILR interrogation    Case to be discussed with epilepsy attending, Dr. 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 parietal infarct.      LKN: No clear specific time, though symptoms became worse on 11/29/20  NIHSS: 3  Baseline MRS: 1  Not a tPA or thrombectomy candidate due to LKN time.     Semiology: L. arm elevation then beating his own chest-->L hand clonic movements which wake him up followed by an ictal cry with subsequent B/L tonic clonic convulsion lasting up to two minutes a/w hypoxia, tongue bite and post-ictal confusion.      Impression: Brief episodes of paresthesias, numbness, apraxia and poor coordination L hand >R hand due to B/L parietal dysfunction.  MRI brain notable for suspected acute L. parietal infarct and possible chronic R. parietal infarct vs ischemic changes though pattern of respecting gyri/sulci almost in cortical ribboning fashion is concerning for other etiology of lesions with DDx including ongoing focal seizure activity, PRES, infectious or autoimmune induced injury.  Suspected mechanism of stroke includes ESUS (hypercoagulable state of Ca) vs. cardioembolic 2* recurrent AF or other arrhythmia.  Combined with frequent reported sensory + phenomena lasting about 1 minute at a time, raises possibility of focal sensory seizures w/o impaired awareness.       Plan  Imaging/Studies:  [] vEEG   [] TTE. Discharge after TTE, if no contraindications.   [] LE dopplers to assess for DVT given hypercoagulable state and off AC.  If negative, add SCD B/L.  Trend platelets to see if/when safe to restart chemical DVT ppx.   Medications:  [] Continue Keppra 1250 AM, 1500 qHS for now.   [] Rescue 2mg ativan for sz>3 min and/or any GTC. Second line lacosamide 200mg IVP if no contra-indication from cardiac perspective.   [] Atorvastatin 80mg daily.   Labs:  [] Fasting lipid panel, A1C  Other:  [] PT/OT, S&S  [] Cardiology recommendations appreciated  [] EP consult recommendations appreciated. ILR interrogated.     Case discussed with epilepsy attending, Dr. Mary

## 2020-12-13 NOTE — PROGRESS NOTE ADULT - SUBJECTIVE AND OBJECTIVE BOX
SUBJECTIVE / OVERNIGHT EVENTS:  Feeling better today.  No overnight event.  No complaints.  Chest pain free. no SOB, no N/V/D.  Denied HA/dizziness, abdominal pain.         --------------------------------------------------------------------------------------------  LABS:                        12.2   8.50  )-----------( 48       ( 12 Dec 2020 07:50 )             38.1         138  |  104  |  10  ----------------------------<  98  4.1   |  24  |  0.81    Ca    8.8      12 Dec 2020 07:50    TPro  6.0  /  Alb  4.5  /  TBili  0.5  /  DBili  x   /  AST  16  /  ALT  17  /  AlkPhos  50  12-11      CAPILLARY BLOOD GLUCOSE            Urinalysis Basic - ( 11 Dec 2020 23:10 )    Color: Light Yellow / Appearance: Clear / S.007 / pH: x  Gluc: x / Ketone: Negative  / Bili: Negative / Urobili: Negative   Blood: x / Protein: Negative / Nitrite: Negative   Leuk Esterase: Negative / RBC: x / WBC x   Sq Epi: x / Non Sq Epi: x / Bacteria: x        RADIOLOGY & ADDITIONAL TESTS:    Imaging Personally Reviewed:  [x] YES  [ ] NO    Consultant(s) Notes Reviewed:  [x] YES  [ ] NO    MEDICATIONS  (STANDING):  acyclovir   Oral Tab/Cap 200 milliGRAM(s) Oral daily  atorvastatin 80 milliGRAM(s) Oral at bedtime  carvedilol 6.25 milliGRAM(s) Oral every 12 hours  cyanocobalamin 1000 MICROGram(s) Oral daily  ergocalciferol 64522 Unit(s) Oral <User Schedule>  ferrous    sulfate 325 milliGRAM(s) Oral at bedtime  influenza   Vaccine 0.5 milliLiter(s) IntraMuscular once  levETIRAcetam 1250 milliGRAM(s) Oral daily  levETIRAcetam 1500 milliGRAM(s) Oral at bedtime  levothyroxine 200 MICROGram(s) Oral <User Schedule>  nystatin Cream 1 Application(s) Topical every 12 hours  venetoclax 20 milliGRAM(s) Oral <User Schedule>  vitamin B complex with vitamin C 1 Tablet(s) Oral daily    MEDICATIONS  (PRN):  lacosamide Injectable 200 milliGRAM(s) IV Push once PRN seizure activity  LORazepam   Injectable 2 milliGRAM(s) IV Push once PRN seizure activity      Care Discussed with Consultants/Other Providers [x] YES  [ ] NO    Vital Signs Last 24 Hrs  T(C): 36.9 (13 Dec 2020 15:02), Max: 37.1 (12 Dec 2020 19:59)  T(F): 98.4 (13 Dec 2020 15:02), Max: 98.8 (12 Dec 2020 19:59)  HR: 76 (13 Dec 2020 15:02) (63 - 95)  BP: 112/60 (13 Dec 2020 15:02) (102/68 - 146/82)  BP(mean): --  RR: 18 (13 Dec 2020 15:02) (18 - 18)  SpO2: 98% (13 Dec 2020 15:02) (95% - 98%)  I&O's Summary      PHYSICAL EXAM:  GENERAL: NAD, well-developed, comfortable  HEAD:  Atraumatic, Normocephalic  EYES: EOMI, PERRLA, conjunctiva and sclera clear  NECK: Supple, No JVD  CHEST/LUNG: Clear to auscultation bilaterally; No wheeze  HEART: Regular rate and rhythm; No murmurs, rubs, or gallops  ABDOMEN: Soft, Nontender, Nondistended; Bowel sounds present  Neuro: AAOx3, no focal weakness, 5/5 b/l extremity strength  EXTREMITIES:  2+ Peripheral Pulses, No clubbing, cyanosis, or edema  SKIN: No rashes or lesions

## 2020-12-13 NOTE — DISCHARGE NOTE PROVIDER - NSDCCPCAREPLAN_GEN_ALL_CORE_FT
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 No seizures captured.   Plan:  Start anticoagulation when no contraindication.   Start taking atorvastatin 80 mg nightly  Continue current medications  Outpatient stroke neurology follow up ([phone], Dr. Lugo/Carmen)  Outpatient primary care follow up (Dr. Guillen)  Outpatient cardiology follow up (Dr. Sood)  Outpatient oncology follow up (Dr. Root)

## 2020-12-13 NOTE — DISCHARGE NOTE PROVIDER - PROVIDER TOKENS
PROVIDER:[TOKEN:[[id number]],FOLLOWUP:[1-3 days],ESTABLISHEDPATIENT:[T]],PROVIDER:[TOKEN:[[id number]],FOLLOWUP:[1 week],ESTABLISHEDPATIENT:[T]],FREE:[LAST:[[place]],FIRST:[Vipul],PHONE:[phone]],FAX:[(   )    -],FOLLOWUP:[1 week],ESTABLISHEDPATIENT:[T]],FREE:[LAST:[[place]],FIRST:[],PHONE:[phone]],FAX:[(   )    -],FOLLOWUP:[1 week],ESTABLISHEDPATIENT:[T]],FREE:[LAST:[Jur[place]],FIRST:[Gianfranco],PHONE:[phone]],FAX:[(   )    -],FOLLOWUP:[1 week],ESTABLISHEDPATIENT:[T]] PROVIDER:[TOKEN:[[id number]],FOLLOWUP:[1-3 days],ESTABLISHEDPATIENT:[T]],PROVIDER:[TOKEN:[[id number]],FOLLOWUP:[1 week],ESTABLISHEDPATIENT:[T]],FREE:[LAST:[[place]],FIRST:[Vipul],PHONE:[phone]],FAX:[(   )    -],FOLLOWUP:[1 week],ESTABLISHEDPATIENT:[T]],FREE:[LAST:[[place]rie],FIRST:[],PHONE:[phone]],FAX:[(   )    -],FOLLOWUP:[1 week],ESTABLISHEDPATIENT:[T]],FREE:[LAST:[Jurgen],FIRST:[Gianfranco],PHONE:[phone]],FAX:[(   )    -],FOLLOWUP:[1 week],ESTABLISHEDPATIENT:[T]],PROVIDER:[TOKEN:[[id number]]]

## 2020-12-13 NOTE — PROCEDURE NOTE - ADDITIONAL PROCEDURE DETAILS
-Pt has 267 recorded eps of AF  -I have personally reviewed all available eps on machine (records from 11/20 onwards), and there are mult eps of irregularly irregular heart rate  -Despite this, pt's baseline rhythm is NSR w/ freq APCs and VPCs  -His recorded eps are not far from baseline rates, and irregular beats are likely APCs/VPCs in context of sinus rhythm  -AF cannot be definitely r/o, but is lower on ddx

## 2020-12-14 ENCOUNTER — TRANSCRIPTION ENCOUNTER (OUTPATIENT)
Age: 75
End: 2020-12-14

## 2020-12-14 VITALS
DIASTOLIC BLOOD PRESSURE: 65 MMHG | OXYGEN SATURATION: 96 % | HEART RATE: 61 BPM | SYSTOLIC BLOOD PRESSURE: 116 MMHG | RESPIRATION RATE: 18 BRPM | TEMPERATURE: 98 F

## 2020-12-14 LAB
A1C WITH ESTIMATED AVERAGE GLUCOSE RESULT: 5.3 % — SIGNIFICANT CHANGE UP (ref 4–5.6)
CHOLEST SERPL-MCNC: 115 MG/DL — SIGNIFICANT CHANGE UP
ESTIMATED AVERAGE GLUCOSE: 105 MG/DL — SIGNIFICANT CHANGE UP (ref 68–114)
HDLC SERPL-MCNC: 35 MG/DL — LOW
LIPID PNL WITH DIRECT LDL SERPL: 68 MG/DL — SIGNIFICANT CHANGE UP
NON HDL CHOLESTEROL: 80 MG/DL — SIGNIFICANT CHANGE UP
TRIGL SERPL-MCNC: 60 MG/DL — SIGNIFICANT CHANGE UP

## 2020-12-14 PROCEDURE — 99238 HOSP IP/OBS DSCHRG MGMT 30/<: CPT

## 2020-12-14 PROCEDURE — 93970 EXTREMITY STUDY: CPT | Mod: 26

## 2020-12-14 PROCEDURE — 99232 SBSQ HOSP IP/OBS MODERATE 35: CPT

## 2020-12-14 RX ORDER — ATORVASTATIN CALCIUM 80 MG/1
1 TABLET, FILM COATED ORAL
Qty: 30 | Refills: 0
Start: 2020-12-14 | End: 2021-01-12

## 2020-12-14 RX ADMIN — Medication 1 TABLET(S): at 12:06

## 2020-12-14 RX ADMIN — Medication 200 MILLIGRAM(S): at 12:06

## 2020-12-14 RX ADMIN — CARVEDILOL PHOSPHATE 6.25 MILLIGRAM(S): 80 CAPSULE, EXTENDED RELEASE ORAL at 05:17

## 2020-12-14 RX ADMIN — PREGABALIN 1000 MICROGRAM(S): 225 CAPSULE ORAL at 12:06

## 2020-12-14 RX ADMIN — LEVETIRACETAM 1250 MILLIGRAM(S): 250 TABLET, FILM COATED ORAL at 12:06

## 2020-12-14 NOTE — PROGRESS NOTE ADULT - ASSESSMENT
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 fronto-parietal ischemic infarct.     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 etiology ? cardioembolic vs. r/o other causes   CT angio: left parietal recent, acute infarct, no flow limiting stenosis  no source of infection, neg UA and neg CXR. no leukocytosis/no fever.   vEEG neg for focal seizures. c/w Keppra 1250 AM, 1500 qHS   hx of p.afib s/p cardioversion x 3. He has been in sinus per the wife.  (He was on Eliquis but taken off since prior a.fib was thought to be due to chemo)  No prior history of bleeding, despite thrombocytopenia.   s/p EP eval and Interrogation of loop recorder, no definitive a.fib. noted PVC/PACs.  TTE with normal LV  no urgent need for watchman's procedure per EP  Cardiology Dr. Cordova, Dr. Rooney seeing here.  c/w Aspirin, Lipitor. BP control. Permissive HTN  Dr. Vipul Eddy (outpt heme).  dc planning per neuro with outpt follow up.   DVT ppx    - Dr. MCGOVERN Htet ([facility])  - [phone]

## 2020-12-14 NOTE — PHYSICAL THERAPY INITIAL EVALUATION ADULT - ASR WT BEARING STATUS EVAL
CONT: CT Head/Neck/Brain 12/11/20 - NONCONTRAST HEAD CT SCAN: 1. Left parietal recent, acute infarct, correlates with findings on MRI brain 12/11/2020. 2. Additional nonacute infarcts as above. 3. No acute intracranial hemorrhage, extra-axial fluid collection or midline shift. CT ANGIOGRAPHY NECK: Patent cervical vasculature. No hemodynamically significant carotid stenosis or flow-limiting vertebral artery stenosis.  No evidence of dissection. CT ANGIOGRAPHY BRAIN: No vessel occlusion, flow-limiting stenosis or aneurysm is identified about the [place]. CXR 12/11/20 - Clear lungs./no weight-bearing restrictions

## 2020-12-14 NOTE — PHYSICAL THERAPY INITIAL EVALUATION ADULT - ADDITIONAL COMMENTS
according to care coordinator note, lives at home with wife, PLOF independent. Pt lives in private 2 story house with wife and son, has 5 steps to enter with bilateral rails. Pt bedroom and bathroom are on 2nd floor, with 13 steps to reach and bilateral rails. Pt reports having a tub shower +grab bar, +shower chair in home but does not use. +driving, +RHD, +glasses for reading. Prior to adm, pt was independent with all ADLs, iADLs, and functional mobility.

## 2020-12-14 NOTE — PROGRESS NOTE ADULT - SUBJECTIVE AND OBJECTIVE BOX
SUBJECTIVE / OVERNIGHT EVENTS:  No overnight events.  No cp/sob/n/v/d.  No HA/dizziness.  No abdominal pain.   no events on tele    --------------------------------------------------------------------------------------------  LABS:            CAPILLARY BLOOD GLUCOSE                RADIOLOGY & ADDITIONAL TESTS:    Imaging Personally Reviewed:  [x] YES  [ ] NO    Consultant(s) Notes Reviewed:  [x] YES  [ ] NO    MEDICATIONS  (STANDING):  acyclovir   Oral Tab/Cap 200 milliGRAM(s) Oral daily  atorvastatin 80 milliGRAM(s) Oral at bedtime  carvedilol 6.25 milliGRAM(s) Oral every 12 hours  cyanocobalamin 1000 MICROGram(s) Oral daily  ergocalciferol 96917 Unit(s) Oral <User Schedule>  ferrous    sulfate 325 milliGRAM(s) Oral at bedtime  influenza   Vaccine 0.5 milliLiter(s) IntraMuscular once  levETIRAcetam 1250 milliGRAM(s) Oral daily  levETIRAcetam 1500 milliGRAM(s) Oral at bedtime  levothyroxine 200 MICROGram(s) Oral <User Schedule>  nystatin Cream 1 Application(s) Topical every 12 hours  venetoclax 20 milliGRAM(s) Oral <User Schedule>  vitamin B complex with vitamin C 1 Tablet(s) Oral daily    MEDICATIONS  (PRN):  lacosamide Injectable 200 milliGRAM(s) IV Push once PRN seizure activity  LORazepam   Injectable 2 milliGRAM(s) IV Push once PRN seizure activity      Care Discussed with Consultants/Other Providers [x] YES  [ ] NO    Vital Signs Last 24 Hrs  T(C): 36.7 (14 Dec 2020 12:46), Max: 36.9 (14 Dec 2020 08:01)  T(F): 98.1 (14 Dec 2020 12:46), Max: 98.4 (14 Dec 2020 08:01)  HR: 61 (14 Dec 2020 12:46) (61 - 71)  BP: 116/65 (14 Dec 2020 12:46) (116/65 - 132/69)  BP(mean): --  RR: 18 (14 Dec 2020 12:46) (17 - 18)  SpO2: 96% (14 Dec 2020 12:46) (95% - 97%)  I&O's Summary      PHYSICAL EXAM:  GENERAL: NAD, well-developed, comfortable  HEAD:  Atraumatic, Normocephalic  EYES: EOMI, PERRLA, conjunctiva and sclera clear  NECK: Supple, No JVD  CHEST/LUNG: Clear to auscultation bilaterally; No wheeze  HEART: Regular rate and rhythm; No murmurs, rubs, or gallops  ABDOMEN: Soft, Nontender, Nondistended; Bowel sounds present  Neuro: AAOx3, no focal weakness, 5/5 b/l extremity strength  EXTREMITIES:  2+ Peripheral Pulses, No clubbing, cyanosis, or edema  SKIN: No rashes or lesions

## 2020-12-14 NOTE — PROGRESS NOTE ADULT - ASSESSMENT
1. hx of PAF recent ventriclar arrythmias  2. thrombocytopenia  3. acute CVA ? embolic  4. noc ardiac symptoms  5. normal Lv fucntion  6. no evidence of afib    Recommend  discharge home  monitor as outpatient  no further in patient cardiac workup

## 2020-12-14 NOTE — DISCHARGE NOTE NURSING/CASE MANAGEMENT/SOCIAL WORK - PATIENT PORTAL LINK FT
You can access the FollowMyHealth Patient Portal offered by [facility] by registering at the following website: [email or web address]. By joining [facility]’s FollowMyHealth portal, you will also be able to view your health information using other applications (apps) compatible with our system.

## 2020-12-14 NOTE — PROGRESS NOTE ADULT - SUBJECTIVE AND OBJECTIVE BOX
Subjective: Patient seen and examined. No new events except as noted.   Patient feels well no palpitations sob or chest pain  Remains NSR no evidence of afib  echo normal LV function no valve disease  MEDICATIONS:  MEDICATIONS  (STANDING):  acyclovir   Oral Tab/Cap 200 milliGRAM(s) Oral daily  atorvastatin 80 milliGRAM(s) Oral at bedtime  carvedilol 6.25 milliGRAM(s) Oral every 12 hours  cyanocobalamin 1000 MICROGram(s) Oral daily  ergocalciferol 67194 Unit(s) Oral <User Schedule>  ferrous    sulfate 325 milliGRAM(s) Oral at bedtime  influenza   Vaccine 0.5 milliLiter(s) IntraMuscular once  levETIRAcetam 1250 milliGRAM(s) Oral daily  levETIRAcetam 1500 milliGRAM(s) Oral at bedtime  levothyroxine 200 MICROGram(s) Oral <User Schedule>  nystatin Cream 1 Application(s) Topical every 12 hours  venetoclax 20 milliGRAM(s) Oral <User Schedule>  vitamin B complex with vitamin C 1 Tablet(s) Oral daily      PHYSICAL EXAM:  T(C): 36.7 (12-14-20 @ 12:46), Max: 37.2 (12-13-20 @ 20:13)  HR: 61 (12-14-20 @ 12:46) (61 - 76)  BP: 116/65 (12-14-20 @ 12:46) (112/60 - 132/69)  RR: 18 (12-14-20 @ 12:46) (17 - 18)  SpO2: 96% (12-14-20 @ 12:46) (95% - 98%)  Wt(kg): --  I&O's Summary        Appearance: Normal	looks well NAD  HEENT:   Normal oral mucosa, PERRL, EOMI	  Cardiovascular: Normal S1 S2, No JVD, No murmurs ,  Respiratory: Lungs clear to auscultation, normal effort 	  Gastrointestinal:  Soft, Non-tender, + BS	  Skin: No rashes, No ecchymoses, No cyanosis, warm to touch  Musculoskeletal: Normal range of motion, normal strength  Psychiatry:  Mood & affect appropriate  Ext: No edema  Peripheral pulses palpable 2+ bilaterally      LABS:    CARDIAC MARKERS:                  proBNP:   Lipid Profile: total cholesterol 115  LDL --  HDL 35  HDl/Total Ratio --  Trig 60    HgA1c:   TSH:           TELEMETRY: 	    ECG:  	  RADIOLOGY:   DIAGNOSTIC TESTING:  [ ] Echocardiogram:  [ ]  Catheterization:  [ ] Stress Test:    OTHER: 	      < from: TTE with Doppler (w/Cont) (12.13.20 @ 13:16) >  Observations:  Mitral Valve: Normal mitral valve.  Aortic Valve/Aorta: Calcified trileaflet aortic valve with  normal opening. Minimal aortic regurgitation.  Aortic Root: 3.7 cm.  Left Atrium: Normal left atrium.  LA volume index = 27  cc/m2.  Left Ventricle: Normal left ventricular systolic function.  No segmental wall motion abnormalities.   Endocardial  visualization enhanced with intravenous injection of  Ultrasonic Enhancing Agent (Definity). Normal left  ventricular internal dimensions and wall thicknesses. Mild  diastolic dysfunction (Stage I).  Right Heart: Normal right atrium. Normal right ventricular  size and function. Normal tricuspid valve. Minimal  tricuspid regurgitation. Normal pulmonic valve.  Pericardium/Pleura: Normal pericardium with no pericardial  effusion.  Hemodynamic: Estimated right atrial pressure is 8 mm Hg.  Estimated right ventricular systolic pressure equals 37 mm  Hg, assuming right atrial pressure equals 8 mm Hg,  consistent with borderline pulmonary hypertension.  ------------------------------------------------------------------------  Conclusions:  1. Normal left ventricular internal dimensions and wall  thicknesses.  2. Normal left ventricular systolic function. No segmental  wall motion abnormalities.   Endocardial visualization    < end of copied text >

## 2020-12-14 NOTE — PHYSICAL THERAPY INITIAL EVALUATION ADULT - PERTINENT HX OF CURRENT PROBLEM, REHAB EVAL
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 parietal infarct.

## 2020-12-14 NOTE — PROGRESS NOTE ADULT - REASON FOR ADMISSION
Seizure vs. Stroke work up

## 2020-12-14 NOTE — PHYSICAL THERAPY INITIAL EVALUATION ADULT - GENERAL OBSERVATIONS, REHAB EVAL
Orders received, chart reviewed. Pt received supine in bed, A&Ox4, agreeable to physical therapy cha christensen 45 min.

## 2020-12-14 NOTE — PROGRESS NOTE ADULT - ASSESSMENT
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 parietal infarct.      LKN: No clear specific time, though symptoms became worse on 11/29/20  NIHSS: 3  Baseline MRS: 1  Not a tPA or thrombectomy candidate due to LKN time.     Semiology: L. arm elevation then beating his own chest-->L hand clonic movements which wake him up followed by an ictal cry with subsequent B/L tonic clonic convulsion lasting up to two minutes a/w hypoxia, tongue bite and post-ictal confusion.      Impression: Brief episodes of paresthesias, numbness, apraxia and poor coordination L hand >R hand due to B/L parietal dysfunction.  MRI brain notable for suspected acute L. parietal infarct and possible chronic R. parietal infarct vs ischemic changes though pattern of respecting gyri/sulci almost in cortical ribboning fashion is concerning for other etiology of lesions with DDx including ongoing focal seizure activity, PRES, infectious or autoimmune induced injury.  Suspected mechanism of stroke includes ESUS (hypercoagulable state of Ca) vs. cardioembolic 2* recurrent AF or other arrhythmia.  Combined with frequent reported sensory + phenomena lasting about 1 minute at a time, raises possibility of focal sensory seizures w/o impaired awareness.       Plan  [] Plan for dc today  [] Continue Keppra 1250 AM, 1500 qHS for now.   [] Atorvastatin 80mg daily.   [] AC can be started when ok with outpt cardio/EP  [] Outpt follow up with neurology, cardiology and oncology      Case discussed with neurology attending Dr. Charles

## 2020-12-14 NOTE — PROGRESS NOTE ADULT - SUBJECTIVE AND OBJECTIVE BOX
Interval History:  Patient seen and examined at the bedside. No acute events overnight.     MEDICATIONS  (STANDING):  acyclovir   Oral Tab/Cap 200 milliGRAM(s) Oral daily  atorvastatin 80 milliGRAM(s) Oral at bedtime  carvedilol 6.25 milliGRAM(s) Oral every 12 hours  cyanocobalamin 1000 MICROGram(s) Oral daily  ergocalciferol 71127 Unit(s) Oral <User Schedule>  ferrous    sulfate 325 milliGRAM(s) Oral at bedtime  influenza   Vaccine 0.5 milliLiter(s) IntraMuscular once  levETIRAcetam 1250 milliGRAM(s) Oral daily  levETIRAcetam 1500 milliGRAM(s) Oral at bedtime  levothyroxine 200 MICROGram(s) Oral <User Schedule>  nystatin Cream 1 Application(s) Topical every 12 hours  venetoclax 20 milliGRAM(s) Oral <User Schedule>  vitamin B complex with vitamin C 1 Tablet(s) Oral daily    MEDICATIONS  (PRN):  lacosamide Injectable 200 milliGRAM(s) IV Push once PRN seizure activity  LORazepam   Injectable 2 milliGRAM(s) IV Push once PRN seizure activity    Allergies  No Known Allergies    Intolerances      ROS: Pertinent positives in HPI, all other ROS were reviewed and are negative.      Vital Signs Last 24 Hrs  T(C): 36.9 (14 Dec 2020 08:01), Max: 37.2 (13 Dec 2020 20:13)  T(F): 98.4 (14 Dec 2020 08:01), Max: 99 (13 Dec 2020 20:13)  HR: 67 (14 Dec 2020 08:01) (63 - 76)  BP: 125/74 (14 Dec 2020 08:01) (112/60 - 132/69)  BP(mean): --  RR: 18 (14 Dec 2020 08:01) (17 - 18)  SpO2: 95% (14 Dec 2020 08:01) (95% - 98%)    NEUROLOGICAL EXAM:  MS: AAOX3, fluent, attends b/l; recent and remote memory intact; normal attention, language and fund of knowledge.   CN: VFF, EOMI, PERRL, no OVIDIO, no APD,  V1-3 intact, no facial asymmetry, t/p midline, SCM/trap intact.  Motor: Strength: Moves all extremities spontaneously  Gait:  Deferred       Labs:   cbc  Chem      Coags  [id number] Chol 115 LDL -- HDL 35<L> Trig 60  A1C  CardiacMarkers    LFTs  UA

## 2020-12-14 NOTE — EEG REPORT - NS EEG TEXT BOX
Starting: Day 3   12/13/2020 8:00 AM       End: 12/14/2020 8:00 AM       Duration: 24h 0m    Daily EEG Visual Analysis  FINDINGS:  The background was continuous, spontaneously variable and reactive. During wakefulness, the posterior dominant rhythm consisted of symmetric, well-modulated 8.5-9 Hz activity, with amplitude to 30 uV, that attenuated to eye opening.  Low amplitude frontal beta was noted in wakefulness.    Background Slowing:  No generalized background slowing was present.    Focal Slowing:   None were present.    Sleep Background:  Drowsiness was characterized by fragmentation, attenuation, and slowing of the background activity.    Sleep was characterized by the presence of vertex waves, symmetric sleep spindles and K-complexes.    Other Non-Epileptiform Findings:  None were present.  Diffuse excess beta activity.    Interictal Epileptiform Activity:   None were present.    Events:  none    Artifacts:  Intermittent myogenic and movement artifacts were noted.    ECG:  The heart rate on single channel ECG was predominantly between 70-80 BPM.    AEDs:  Levetiracetam 1250/1500    EEG Summary:  Normal EEG in the awake, drowsy and asleep states.      Impression/Clinical Correlate:  This is a normal study. No epileptiform abnormalities noted.     ________________________________________      Fox Fournier MD PGY-5  Epilepsy Fellow    This Preliminary report is based on fellow review. Final report pending attending review.    Reading Room: [phone]  On Call Service After Hours: [phone]   Starting: Day 3   12/13/2020 8:00 AM       End: 12/14/2020 8:00 AM       Duration: 24h 0m    Daily EEG Visual Analysis  FINDINGS:  The background was continuous, spontaneously variable and reactive. During wakefulness, the posterior dominant rhythm consisted of symmetric, well-modulated 8.5-9 Hz activity, with amplitude to 30 uV, that attenuated to eye opening.  Low amplitude frontal beta was noted in wakefulness.    Background Slowing:  No generalized background slowing was present.    Focal Slowing:   None were present.    Sleep Background:  Drowsiness was characterized by fragmentation, attenuation, and slowing of the background activity.    Sleep was characterized by the presence of vertex waves, symmetric sleep spindles and K-complexes.    Other Non-Epileptiform Findings:  None were present.  Diffuse excess beta activity.    Interictal Epileptiform Activity:   None were present.    Events:  none    Artifacts:  Intermittent myogenic and movement artifacts were noted.    ECG:  The heart rate on single channel ECG was predominantly between 70-80 BPM.    AEDs:  Levetiracetam 1250/1500    EEG Summary:  Normal EEG in the awake, drowsy and asleep states.      Impression/Clinical Correlate:  This is a normal study. No epileptiform abnormalities noted.     ________________________________________      Fox Fournier MD PGY-5  Epilepsy Fellow    Carlton Mary MD PhD  Director, Epilepsy Division, [facility]     Reading Room: [phone]  On Call Service After Hours: [phone]

## 2020-12-15 LAB — LEVETIRACETAM SERPL-MCNC: 27.6 UG/ML — SIGNIFICANT CHANGE UP (ref 10–40)

## 2020-12-16 LAB — LEVETIRACETAM SERPL-MCNC: 13.5 UG/ML — SIGNIFICANT CHANGE UP (ref 10–40)

## 2020-12-17 ENCOUNTER — NON-APPOINTMENT (OUTPATIENT)
Age: 75
End: 2020-12-17

## 2020-12-17 RX ORDER — CHOLECALCIFEROL (VITAMIN D3) 1250 MCG
1.25 MG CAPSULE ORAL
Refills: 0 | Status: ACTIVE | COMMUNITY

## 2020-12-17 RX ORDER — CARVEDILOL 6.25 MG/1
6.25 TABLET, FILM COATED ORAL TWICE DAILY
Refills: 0 | Status: ACTIVE | COMMUNITY

## 2020-12-17 RX ORDER — LEVOTHYROXINE SODIUM 0.2 MG/1
200 TABLET ORAL
Refills: 0 | Status: ACTIVE | COMMUNITY

## 2020-12-17 RX ORDER — VENETOCLAX 10 MG/1
10 TABLET, FILM COATED ORAL
Refills: 0 | Status: ACTIVE | COMMUNITY

## 2020-12-17 RX ORDER — ATORVASTATIN CALCIUM 20 MG/1
20 TABLET, FILM COATED ORAL
Refills: 0 | Status: ACTIVE | COMMUNITY

## 2020-12-17 RX ORDER — CHLORHEXIDINE GLUCONATE 4 %
325 (65 FE) LIQUID (ML) TOPICAL DAILY
Refills: 0 | Status: ACTIVE | COMMUNITY

## 2020-12-17 RX ORDER — MULTIVIT-MIN/FOLIC/VIT K/LYCOP 400-300MCG
1000 TABLET ORAL DAILY
Refills: 0 | Status: ACTIVE | COMMUNITY

## 2020-12-17 RX ORDER — LEVETIRACETAM 500 MG/1
500 TABLET, FILM COATED ORAL
Refills: 0 | Status: ACTIVE | COMMUNITY

## 2020-12-28 ENCOUNTER — NON-APPOINTMENT (OUTPATIENT)
Age: 75
End: 2020-12-28

## 2020-12-28 PROCEDURE — 71045 X-RAY EXAM CHEST 1 VIEW: CPT

## 2020-12-28 PROCEDURE — 81003 URINALYSIS AUTO W/O SCOPE: CPT

## 2020-12-28 PROCEDURE — 85027 COMPLETE CBC AUTOMATED: CPT

## 2020-12-28 PROCEDURE — C8929: CPT

## 2020-12-28 PROCEDURE — 87086 URINE CULTURE/COLONY COUNT: CPT

## 2020-12-28 PROCEDURE — 80048 BASIC METABOLIC PNL TOTAL CA: CPT

## 2020-12-28 PROCEDURE — 86803 HEPATITIS C AB TEST: CPT

## 2020-12-28 PROCEDURE — 83036 HEMOGLOBIN GLYCOSYLATED A1C: CPT

## 2020-12-28 PROCEDURE — 95715 VEEG EA 12-26HR INTMT MNTR: CPT

## 2020-12-28 PROCEDURE — 93005 ELECTROCARDIOGRAM TRACING: CPT

## 2020-12-28 PROCEDURE — 97161 PT EVAL LOW COMPLEX 20 MIN: CPT

## 2020-12-28 PROCEDURE — 70496 CT ANGIOGRAPHY HEAD: CPT

## 2020-12-28 PROCEDURE — 84484 ASSAY OF TROPONIN QUANT: CPT

## 2020-12-28 PROCEDURE — 93970 EXTREMITY STUDY: CPT

## 2020-12-28 PROCEDURE — 80061 LIPID PANEL: CPT

## 2020-12-28 PROCEDURE — 95700 EEG CONT REC W/VID EEG TECH: CPT

## 2020-12-28 PROCEDURE — U0003: CPT

## 2020-12-28 PROCEDURE — 85025 COMPLETE CBC W/AUTO DIFF WBC: CPT

## 2020-12-28 PROCEDURE — 80053 COMPREHEN METABOLIC PANEL: CPT

## 2020-12-28 PROCEDURE — 80177 DRUG SCRN QUAN LEVETIRACETAM: CPT

## 2020-12-28 PROCEDURE — 70498 CT ANGIOGRAPHY NECK: CPT

## 2021-02-08 NOTE — DISCHARGE NOTE NURSING/CASE MANAGEMENT/SOCIAL WORK - NSTRANSFERBELONGINGSRESP_GEN_A_NUR
Have you been tested for COVID  Yes           Have you been told you were positive for COVID No  Have you had any known exposure to someone that is positive for COVID No  Do you have a cough                   No              Do you have shortness of breath No                 Do you have a sore throat            No                Are you having chills                    No                Are you having muscle aches. No                    Please come to the hospital wearing a mask and have your significant other wear a mask as well. Both of you should check your temperature before leaving to come here,  if it is 100 or higher please call [phone] for instruction. yes

## 2022-01-01 ENCOUNTER — INPATIENT (INPATIENT)
Facility: HOSPITAL | Age: 77
LOS: 1 days | Discharge: HOME | DRG: 309 | End: 2022-07-23
Attending: INTERNAL MEDICINE | Admitting: INTERNAL MEDICINE
Payer: MEDICARE

## 2022-01-01 ENCOUNTER — TRANSCRIPTION ENCOUNTER (OUTPATIENT)
Age: 77
End: 2022-01-01

## 2022-01-01 VITALS
RESPIRATION RATE: 18 BRPM | HEIGHT: 76 IN | WEIGHT: 250 LBS | DIASTOLIC BLOOD PRESSURE: 68 MMHG | HEART RATE: 145 BPM | SYSTOLIC BLOOD PRESSURE: 134 MMHG | OXYGEN SATURATION: 95 % | TEMPERATURE: 99 F

## 2022-01-01 VITALS
HEART RATE: 86 BPM | SYSTOLIC BLOOD PRESSURE: 124 MMHG | OXYGEN SATURATION: 97 % | RESPIRATION RATE: 18 BRPM | DIASTOLIC BLOOD PRESSURE: 81 MMHG | TEMPERATURE: 98 F

## 2022-01-01 DIAGNOSIS — Z98.89 OTHER SPECIFIED POSTPROCEDURAL STATES: Chronic | ICD-10-CM

## 2022-01-01 DIAGNOSIS — Z89.422 ACQUIRED ABSENCE OF OTHER LEFT TOE(S): Chronic | ICD-10-CM

## 2022-01-01 DIAGNOSIS — Z98.890 OTHER SPECIFIED POSTPROCEDURAL STATES: Chronic | ICD-10-CM

## 2022-01-01 DIAGNOSIS — Z98.49 CATARACT EXTRACTION STATUS, UNSPECIFIED EYE: Chronic | ICD-10-CM

## 2022-01-01 DIAGNOSIS — Z96.659 PRESENCE OF UNSPECIFIED ARTIFICIAL KNEE JOINT: Chronic | ICD-10-CM

## 2022-01-01 DIAGNOSIS — S84.10XA INJURY OF PERONEAL NERVE AT LOWER LEG LEVEL, UNSPECIFIED LEG, INITIAL ENCOUNTER: Chronic | ICD-10-CM

## 2022-01-01 DIAGNOSIS — I48.91 UNSPECIFIED ATRIAL FIBRILLATION: ICD-10-CM

## 2022-01-01 LAB
ALBUMIN SERPL ELPH-MCNC: 4.5 G/DL — SIGNIFICANT CHANGE UP (ref 3.3–5)
ALP SERPL-CCNC: 54 U/L — SIGNIFICANT CHANGE UP (ref 40–120)
ALT FLD-CCNC: 16 U/L — SIGNIFICANT CHANGE UP (ref 10–45)
ANION GAP SERPL CALC-SCNC: 12 MMOL/L — SIGNIFICANT CHANGE UP (ref 5–17)
ANISOCYTOSIS BLD QL: SLIGHT — SIGNIFICANT CHANGE UP
APPEARANCE UR: CLEAR — SIGNIFICANT CHANGE UP
APTT BLD: 39.8 SEC — HIGH (ref 27.5–35.5)
AST SERPL-CCNC: 16 U/L — SIGNIFICANT CHANGE UP (ref 10–40)
BACTERIA # UR AUTO: NEGATIVE — SIGNIFICANT CHANGE UP
BASE EXCESS BLDV CALC-SCNC: -0.3 MMOL/L — SIGNIFICANT CHANGE UP (ref -2–2)
BASOPHILS # BLD AUTO: 0.09 K/UL — SIGNIFICANT CHANGE UP (ref 0–0.2)
BASOPHILS NFR BLD AUTO: 1 % — SIGNIFICANT CHANGE UP (ref 0–2)
BILIRUB SERPL-MCNC: 0.7 MG/DL — SIGNIFICANT CHANGE UP (ref 0.2–1.2)
BILIRUB UR-MCNC: NEGATIVE — SIGNIFICANT CHANGE UP
BUN SERPL-MCNC: 16 MG/DL — SIGNIFICANT CHANGE UP (ref 7–23)
CA-I BLD-SCNC: 1.12 MMOL/L — LOW (ref 1.15–1.33)
CA-I SERPL-SCNC: 1.18 MMOL/L — SIGNIFICANT CHANGE UP (ref 1.15–1.33)
CALCIUM SERPL-MCNC: 8.9 MG/DL — SIGNIFICANT CHANGE UP (ref 8.4–10.5)
CHLORIDE BLDV-SCNC: 100 MMOL/L — SIGNIFICANT CHANGE UP (ref 96–108)
CHLORIDE SERPL-SCNC: 101 MMOL/L — SIGNIFICANT CHANGE UP (ref 96–108)
CK MB BLD-MCNC: 6.7 % — HIGH (ref 0–3.5)
CK MB CFR SERPL CALC: 3.8 NG/ML — SIGNIFICANT CHANGE UP (ref 0–6.7)
CK SERPL-CCNC: 57 U/L — SIGNIFICANT CHANGE UP (ref 30–200)
CO2 BLDV-SCNC: 28 MMOL/L — HIGH (ref 22–26)
CO2 SERPL-SCNC: 25 MMOL/L — SIGNIFICANT CHANGE UP (ref 22–31)
COLOR SPEC: ABNORMAL
CREAT SERPL-MCNC: 0.99 MG/DL — SIGNIFICANT CHANGE UP (ref 0.5–1.3)
CULTURE RESULTS: SIGNIFICANT CHANGE UP
DACRYOCYTES BLD QL SMEAR: SLIGHT — SIGNIFICANT CHANGE UP
DIFF PNL FLD: NEGATIVE — SIGNIFICANT CHANGE UP
EGFR: 79 ML/MIN/1.73M2 — SIGNIFICANT CHANGE UP
ELLIPTOCYTES BLD QL SMEAR: SLIGHT — SIGNIFICANT CHANGE UP
EOSINOPHIL # BLD AUTO: 1.21 K/UL — HIGH (ref 0–0.5)
EOSINOPHIL NFR BLD AUTO: 14 % — HIGH (ref 0–6)
EPI CELLS # UR: 0 /HPF — SIGNIFICANT CHANGE UP
FLUAV AG NPH QL: SIGNIFICANT CHANGE UP
FLUBV AG NPH QL: SIGNIFICANT CHANGE UP
GAS PNL BLDV: 134 MMOL/L — LOW (ref 136–145)
GAS PNL BLDV: SIGNIFICANT CHANGE UP
GAS PNL BLDV: SIGNIFICANT CHANGE UP
GLUCOSE BLDV-MCNC: 123 MG/DL — HIGH (ref 70–99)
GLUCOSE SERPL-MCNC: 135 MG/DL — HIGH (ref 70–99)
GLUCOSE UR QL: NEGATIVE — SIGNIFICANT CHANGE UP
HCO3 BLDV-SCNC: 27 MMOL/L — SIGNIFICANT CHANGE UP (ref 22–29)
HCT VFR BLD CALC: 39.8 % — SIGNIFICANT CHANGE UP (ref 39–50)
HCT VFR BLD CALC: 40.1 % — SIGNIFICANT CHANGE UP (ref 39–50)
HCT VFR BLDA CALC: 37 % — LOW (ref 39–51)
HGB BLD CALC-MCNC: 12.2 G/DL — LOW (ref 12.6–17.4)
HGB BLD-MCNC: 12.4 G/DL — LOW (ref 13–17)
HGB BLD-MCNC: 12.5 G/DL — LOW (ref 13–17)
HYALINE CASTS # UR AUTO: 1 /LPF — SIGNIFICANT CHANGE UP (ref 0–2)
INR BLD: 1.37 RATIO — HIGH (ref 0.88–1.16)
KETONES UR-MCNC: NEGATIVE — SIGNIFICANT CHANGE UP
LACTATE BLDV-MCNC: 1.7 MMOL/L — SIGNIFICANT CHANGE UP (ref 0.7–2)
LDH SERPL L TO P-CCNC: 227 U/L — SIGNIFICANT CHANGE UP (ref 50–242)
LEUKOCYTE ESTERASE UR-ACNC: NEGATIVE — SIGNIFICANT CHANGE UP
LG PLATELETS BLD QL AUTO: SLIGHT — SIGNIFICANT CHANGE UP
LYMPHOCYTES # BLD AUTO: 2.07 K/UL — SIGNIFICANT CHANGE UP (ref 1–3.3)
LYMPHOCYTES # BLD AUTO: 24 % — SIGNIFICANT CHANGE UP (ref 13–44)
MAGNESIUM SERPL-MCNC: 2.1 MG/DL — SIGNIFICANT CHANGE UP (ref 1.6–2.6)
MANUAL SMEAR VERIFICATION: SIGNIFICANT CHANGE UP
MCHC RBC-ENTMCNC: 28.3 PG — SIGNIFICANT CHANGE UP (ref 27–34)
MCHC RBC-ENTMCNC: 28.4 PG — SIGNIFICANT CHANGE UP (ref 27–34)
MCHC RBC-ENTMCNC: 31.2 GM/DL — LOW (ref 32–36)
MCHC RBC-ENTMCNC: 31.2 GM/DL — LOW (ref 32–36)
MCV RBC AUTO: 90.7 FL — SIGNIFICANT CHANGE UP (ref 80–100)
MCV RBC AUTO: 91.1 FL — SIGNIFICANT CHANGE UP (ref 80–100)
METAMYELOCYTES # FLD: 2 % — HIGH (ref 0–0)
MICROCYTES BLD QL: SLIGHT — SIGNIFICANT CHANGE UP
MONOCYTES # BLD AUTO: 2.25 K/UL — HIGH (ref 0–0.9)
MONOCYTES NFR BLD AUTO: 26 % — HIGH (ref 2–14)
MRSA PCR RESULT.: SIGNIFICANT CHANGE UP
NEUTROPHILS # BLD AUTO: 2.68 K/UL — SIGNIFICANT CHANGE UP (ref 1.8–7.4)
NEUTROPHILS NFR BLD AUTO: 31 % — LOW (ref 43–77)
NITRITE UR-MCNC: NEGATIVE — SIGNIFICANT CHANGE UP
NRBC # BLD: 0 /100 WBCS — SIGNIFICANT CHANGE UP (ref 0–0)
NRBC # BLD: 0 /100 — SIGNIFICANT CHANGE UP (ref 0–0)
PCO2 BLDV: 53 MMHG — SIGNIFICANT CHANGE UP (ref 42–55)
PH BLDV: 7.31 — LOW (ref 7.32–7.43)
PH UR: 6 — SIGNIFICANT CHANGE UP (ref 5–8)
PHOSPHATE SERPL-MCNC: 4 MG/DL — SIGNIFICANT CHANGE UP (ref 2.5–4.5)
PLAT MORPH BLD: ABNORMAL
PLATELET # BLD AUTO: 69 K/UL — LOW (ref 150–400)
PLATELET # BLD AUTO: 73 K/UL — LOW (ref 150–400)
PO2 BLDV: 26 MMHG — SIGNIFICANT CHANGE UP (ref 25–45)
POIKILOCYTOSIS BLD QL AUTO: SLIGHT — SIGNIFICANT CHANGE UP
POTASSIUM BLDV-SCNC: 4.2 MMOL/L — SIGNIFICANT CHANGE UP (ref 3.5–5.1)
POTASSIUM SERPL-MCNC: 4.4 MMOL/L — SIGNIFICANT CHANGE UP (ref 3.5–5.3)
POTASSIUM SERPL-SCNC: 4.4 MMOL/L — SIGNIFICANT CHANGE UP (ref 3.5–5.3)
PROT SERPL-MCNC: 6.2 G/DL — SIGNIFICANT CHANGE UP (ref 6–8.3)
PROT UR-MCNC: ABNORMAL
PROTHROM AB SERPL-ACNC: 15.9 SEC — HIGH (ref 10.5–13.4)
RBC # BLD: 4.37 M/UL — SIGNIFICANT CHANGE UP (ref 4.2–5.8)
RBC # BLD: 4.42 M/UL — SIGNIFICANT CHANGE UP (ref 4.2–5.8)
RBC # FLD: 15 % — HIGH (ref 10.3–14.5)
RBC # FLD: 15.2 % — HIGH (ref 10.3–14.5)
RBC BLD AUTO: ABNORMAL
RBC CASTS # UR COMP ASSIST: 2 /HPF — SIGNIFICANT CHANGE UP (ref 0–4)
RSV RNA NPH QL NAA+NON-PROBE: SIGNIFICANT CHANGE UP
S AUREUS DNA NOSE QL NAA+PROBE: SIGNIFICANT CHANGE UP
SAO2 % BLDV: 35.2 % — LOW (ref 67–88)
SARS-COV-2 RNA SPEC QL NAA+PROBE: SIGNIFICANT CHANGE UP
SCHISTOCYTES BLD QL AUTO: SLIGHT — SIGNIFICANT CHANGE UP
SODIUM SERPL-SCNC: 138 MMOL/L — SIGNIFICANT CHANGE UP (ref 135–145)
SP GR SPEC: 1.01 — SIGNIFICANT CHANGE UP (ref 1.01–1.02)
SPECIMEN SOURCE: SIGNIFICANT CHANGE UP
T3 SERPL-MCNC: 63 NG/DL — LOW (ref 80–200)
TROPONIN T, HIGH SENSITIVITY RESULT: 24 NG/L — SIGNIFICANT CHANGE UP (ref 0–51)
TROPONIN T, HIGH SENSITIVITY RESULT: 25 NG/L — SIGNIFICANT CHANGE UP (ref 0–51)
TROPONIN T, HIGH SENSITIVITY RESULT: 29 NG/L — SIGNIFICANT CHANGE UP (ref 0–51)
TSH SERPL-MCNC: 3.21 UIU/ML — SIGNIFICANT CHANGE UP (ref 0.27–4.2)
TSH SERPL-MCNC: 3.72 UIU/ML — SIGNIFICANT CHANGE UP (ref 0.27–4.2)
URATE SERPL-MCNC: 6 MG/DL — SIGNIFICANT CHANGE UP (ref 3.4–8.8)
UROBILINOGEN FLD QL: NEGATIVE — SIGNIFICANT CHANGE UP
VARIANT LYMPHS # BLD: 2 % — SIGNIFICANT CHANGE UP (ref 0–6)
WBC # BLD: 8.64 K/UL — SIGNIFICANT CHANGE UP (ref 3.8–10.5)
WBC # BLD: 9.48 K/UL — SIGNIFICANT CHANGE UP (ref 3.8–10.5)
WBC # FLD AUTO: 8.64 K/UL — SIGNIFICANT CHANGE UP (ref 3.8–10.5)
WBC # FLD AUTO: 9.48 K/UL — SIGNIFICANT CHANGE UP (ref 3.8–10.5)
WBC UR QL: 1 /HPF — SIGNIFICANT CHANGE UP (ref 0–5)

## 2022-01-01 PROCEDURE — 87641 MR-STAPH DNA AMP PROBE: CPT

## 2022-01-01 PROCEDURE — 87086 URINE CULTURE/COLONY COUNT: CPT

## 2022-01-01 PROCEDURE — 85730 THROMBOPLASTIN TIME PARTIAL: CPT

## 2022-01-01 PROCEDURE — 84295 ASSAY OF SERUM SODIUM: CPT

## 2022-01-01 PROCEDURE — 83605 ASSAY OF LACTIC ACID: CPT

## 2022-01-01 PROCEDURE — 85025 COMPLETE CBC W/AUTO DIFF WBC: CPT

## 2022-01-01 PROCEDURE — 99222 1ST HOSP IP/OBS MODERATE 55: CPT

## 2022-01-01 PROCEDURE — 87640 STAPH A DNA AMP PROBE: CPT

## 2022-01-01 PROCEDURE — 83615 LACTATE (LD) (LDH) ENZYME: CPT

## 2022-01-01 PROCEDURE — 99291 CRITICAL CARE FIRST HOUR: CPT | Mod: FT,25

## 2022-01-01 PROCEDURE — 82435 ASSAY OF BLOOD CHLORIDE: CPT

## 2022-01-01 PROCEDURE — 82550 ASSAY OF CK (CPK): CPT

## 2022-01-01 PROCEDURE — 85018 HEMOGLOBIN: CPT

## 2022-01-01 PROCEDURE — 36415 COLL VENOUS BLD VENIPUNCTURE: CPT

## 2022-01-01 PROCEDURE — 80053 COMPREHEN METABOLIC PANEL: CPT

## 2022-01-01 PROCEDURE — 85610 PROTHROMBIN TIME: CPT

## 2022-01-01 PROCEDURE — 84484 ASSAY OF TROPONIN QUANT: CPT

## 2022-01-01 PROCEDURE — 93010 ELECTROCARDIOGRAM REPORT: CPT

## 2022-01-01 PROCEDURE — 82947 ASSAY GLUCOSE BLOOD QUANT: CPT

## 2022-01-01 PROCEDURE — 84480 ASSAY TRIIODOTHYRONINE (T3): CPT

## 2022-01-01 PROCEDURE — 87637 SARSCOV2&INF A&B&RSV AMP PRB: CPT

## 2022-01-01 PROCEDURE — 96374 THER/PROPH/DIAG INJ IV PUSH: CPT

## 2022-01-01 PROCEDURE — 71045 X-RAY EXAM CHEST 1 VIEW: CPT

## 2022-01-01 PROCEDURE — 85027 COMPLETE CBC AUTOMATED: CPT

## 2022-01-01 PROCEDURE — 82330 ASSAY OF CALCIUM: CPT

## 2022-01-01 PROCEDURE — 93005 ELECTROCARDIOGRAM TRACING: CPT

## 2022-01-01 PROCEDURE — 81001 URINALYSIS AUTO W/SCOPE: CPT

## 2022-01-01 PROCEDURE — 84443 ASSAY THYROID STIM HORMONE: CPT

## 2022-01-01 PROCEDURE — 84550 ASSAY OF BLOOD/URIC ACID: CPT

## 2022-01-01 PROCEDURE — 84100 ASSAY OF PHOSPHORUS: CPT

## 2022-01-01 PROCEDURE — 99285 EMERGENCY DEPT VISIT HI MDM: CPT

## 2022-01-01 PROCEDURE — 84132 ASSAY OF SERUM POTASSIUM: CPT

## 2022-01-01 PROCEDURE — 82565 ASSAY OF CREATININE: CPT

## 2022-01-01 PROCEDURE — 85014 HEMATOCRIT: CPT

## 2022-01-01 PROCEDURE — 71045 X-RAY EXAM CHEST 1 VIEW: CPT | Mod: 26

## 2022-01-01 PROCEDURE — 82803 BLOOD GASES ANY COMBINATION: CPT

## 2022-01-01 PROCEDURE — 83735 ASSAY OF MAGNESIUM: CPT

## 2022-01-01 PROCEDURE — 82553 CREATINE MB FRACTION: CPT

## 2022-01-01 RX ORDER — ACYCLOVIR SODIUM 500 MG
400 VIAL (EA) INTRAVENOUS DAILY
Refills: 0 | Status: DISCONTINUED | OUTPATIENT
Start: 2022-01-01 | End: 2022-01-01

## 2022-01-01 RX ORDER — VENETOCLAX 100 MG/1
4 TABLET, FILM COATED ORAL
Qty: 0 | Refills: 0 | DISCHARGE

## 2022-01-01 RX ORDER — DILTIAZEM HCL 120 MG
10 CAPSULE, EXT RELEASE 24 HR ORAL ONCE
Refills: 0 | Status: COMPLETED | OUTPATIENT
Start: 2022-01-01 | End: 2022-01-01

## 2022-01-01 RX ORDER — ASPIRIN/CALCIUM CARB/MAGNESIUM 324 MG
1 TABLET ORAL
Qty: 0 | Refills: 0 | DISCHARGE

## 2022-01-01 RX ORDER — SODIUM CHLORIDE 9 MG/ML
1000 INJECTION, SOLUTION INTRAVENOUS ONCE
Refills: 0 | Status: COMPLETED | OUTPATIENT
Start: 2022-01-01 | End: 2022-01-01

## 2022-01-01 RX ORDER — DILTIAZEM HCL 120 MG
30 CAPSULE, EXT RELEASE 24 HR ORAL ONCE
Refills: 0 | Status: COMPLETED | OUTPATIENT
Start: 2022-01-01 | End: 2022-01-01

## 2022-01-01 RX ORDER — CHLORHEXIDINE GLUCONATE 213 G/1000ML
1 SOLUTION TOPICAL
Refills: 0 | Status: DISCONTINUED | OUTPATIENT
Start: 2022-01-01 | End: 2022-01-01

## 2022-01-01 RX ORDER — CARVEDILOL PHOSPHATE 80 MG/1
1 CAPSULE, EXTENDED RELEASE ORAL
Qty: 60 | Refills: 0
Start: 2022-01-01 | End: 2022-01-01

## 2022-01-01 RX ORDER — VENETOCLAX 100 MG/1
2 TABLET, FILM COATED ORAL
Qty: 0 | Refills: 0 | DISCHARGE

## 2022-01-01 RX ORDER — CHOLECALCIFEROL (VITAMIN D3) 125 MCG
1 CAPSULE ORAL
Qty: 0 | Refills: 0 | DISCHARGE

## 2022-01-01 RX ORDER — LEVOTHYROXINE SODIUM 125 MCG
1 TABLET ORAL
Qty: 0 | Refills: 0 | DISCHARGE

## 2022-01-01 RX ORDER — LEVETIRACETAM 250 MG/1
3 TABLET, FILM COATED ORAL
Qty: 0 | Refills: 0 | DISCHARGE

## 2022-01-01 RX ORDER — ASPIRIN/CALCIUM CARB/MAGNESIUM 324 MG
81 TABLET ORAL DAILY
Refills: 0 | Status: DISCONTINUED | OUTPATIENT
Start: 2022-01-01 | End: 2022-01-01

## 2022-01-01 RX ORDER — ASCORBIC ACID 60 MG
1 TABLET,CHEWABLE ORAL
Qty: 0 | Refills: 0 | DISCHARGE

## 2022-01-01 RX ORDER — LEVETIRACETAM 250 MG/1
4.5 TABLET, FILM COATED ORAL
Qty: 0 | Refills: 0 | DISCHARGE

## 2022-01-01 RX ORDER — ACYCLOVIR SODIUM 500 MG
2 VIAL (EA) INTRAVENOUS
Qty: 0 | Refills: 0 | DISCHARGE

## 2022-01-01 RX ORDER — ACETAMINOPHEN 500 MG
650 TABLET ORAL EVERY 6 HOURS
Refills: 0 | Status: DISCONTINUED | OUTPATIENT
Start: 2022-01-01 | End: 2022-01-01

## 2022-01-01 RX ORDER — LEVETIRACETAM 250 MG/1
2500 TABLET, FILM COATED ORAL AT BEDTIME
Refills: 0 | Status: DISCONTINUED | OUTPATIENT
Start: 2022-01-01 | End: 2022-01-01

## 2022-01-01 RX ORDER — ACYCLOVIR SODIUM 500 MG
1 VIAL (EA) INTRAVENOUS
Qty: 0 | Refills: 0 | DISCHARGE

## 2022-01-01 RX ORDER — ATORVASTATIN CALCIUM 80 MG/1
1 TABLET, FILM COATED ORAL
Qty: 0 | Refills: 0 | DISCHARGE

## 2022-01-01 RX ORDER — APIXABAN 2.5 MG/1
1 TABLET, FILM COATED ORAL
Qty: 0 | Refills: 0 | DISCHARGE

## 2022-01-01 RX ORDER — APIXABAN 2.5 MG/1
2.5 TABLET, FILM COATED ORAL
Refills: 0 | Status: DISCONTINUED | OUTPATIENT
Start: 2022-01-01 | End: 2022-01-01

## 2022-01-01 RX ORDER — ATORVASTATIN CALCIUM 80 MG/1
20 TABLET, FILM COATED ORAL AT BEDTIME
Refills: 0 | Status: DISCONTINUED | OUTPATIENT
Start: 2022-01-01 | End: 2022-01-01

## 2022-01-01 RX ORDER — LEVOTHYROXINE SODIUM 125 MCG
200 TABLET ORAL DAILY
Refills: 0 | Status: DISCONTINUED | OUTPATIENT
Start: 2022-01-01 | End: 2022-01-01

## 2022-01-01 RX ORDER — FERROUS SULFATE 325(65) MG
1 TABLET ORAL
Qty: 0 | Refills: 0 | DISCHARGE

## 2022-01-01 RX ORDER — CARVEDILOL PHOSPHATE 80 MG/1
3.12 CAPSULE, EXTENDED RELEASE ORAL EVERY 12 HOURS
Refills: 0 | Status: DISCONTINUED | OUTPATIENT
Start: 2022-01-01 | End: 2022-01-01

## 2022-01-01 RX ORDER — CARVEDILOL PHOSPHATE 80 MG/1
6.25 CAPSULE, EXTENDED RELEASE ORAL EVERY 12 HOURS
Refills: 0 | Status: DISCONTINUED | OUTPATIENT
Start: 2022-01-01 | End: 2022-01-01

## 2022-01-01 RX ORDER — LEVETIRACETAM 250 MG/1
1500 TABLET, FILM COATED ORAL AT BEDTIME
Refills: 0 | Status: DISCONTINUED | OUTPATIENT
Start: 2022-01-01 | End: 2022-01-01

## 2022-01-01 RX ORDER — LEVETIRACETAM 250 MG/1
2.5 TABLET, FILM COATED ORAL
Qty: 0 | Refills: 0 | DISCHARGE

## 2022-01-01 RX ORDER — ENOXAPARIN SODIUM 100 MG/ML
40 INJECTION SUBCUTANEOUS EVERY 24 HOURS
Refills: 0 | Status: DISCONTINUED | OUTPATIENT
Start: 2022-01-01 | End: 2022-01-01

## 2022-01-01 RX ORDER — LEVETIRACETAM 250 MG/1
1250 TABLET, FILM COATED ORAL DAILY
Refills: 0 | Status: DISCONTINUED | OUTPATIENT
Start: 2022-01-01 | End: 2022-01-01

## 2022-01-01 RX ORDER — LEVETIRACETAM 250 MG/1
5 TABLET, FILM COATED ORAL
Qty: 0 | Refills: 0 | DISCHARGE

## 2022-01-01 RX ORDER — PREGABALIN 225 MG/1
1000 CAPSULE ORAL
Qty: 0 | Refills: 0 | DISCHARGE

## 2022-01-01 RX ORDER — CARVEDILOL PHOSPHATE 80 MG/1
1 CAPSULE, EXTENDED RELEASE ORAL
Qty: 0 | Refills: 0 | DISCHARGE

## 2022-01-01 RX ORDER — DILTIAZEM HCL 120 MG
20 CAPSULE, EXT RELEASE 24 HR ORAL ONCE
Refills: 0 | Status: DISCONTINUED | OUTPATIENT
Start: 2022-01-01 | End: 2022-01-01

## 2022-01-01 RX ORDER — LEVETIRACETAM 250 MG/1
2250 TABLET, FILM COATED ORAL DAILY
Refills: 0 | Status: DISCONTINUED | OUTPATIENT
Start: 2022-01-01 | End: 2022-01-01

## 2022-01-01 RX ORDER — ELTROMBOPAG OLAMINE 50 MG/1
1 TABLET, FILM COATED ORAL
Qty: 0 | Refills: 0 | DISCHARGE

## 2022-01-01 RX ORDER — ATORVASTATIN CALCIUM 80 MG/1
10 TABLET, FILM COATED ORAL AT BEDTIME
Refills: 0 | Status: DISCONTINUED | OUTPATIENT
Start: 2022-01-01 | End: 2022-01-01

## 2022-01-01 RX ADMIN — APIXABAN 2.5 MILLIGRAM(S): 2.5 TABLET, FILM COATED ORAL at 05:50

## 2022-01-01 RX ADMIN — Medication 400 MILLIGRAM(S): at 12:24

## 2022-01-01 RX ADMIN — Medication 81 MILLIGRAM(S): at 21:10

## 2022-01-01 RX ADMIN — Medication 200 MICROGRAM(S): at 06:14

## 2022-01-01 RX ADMIN — SODIUM CHLORIDE 1000 MILLILITER(S): 9 INJECTION, SOLUTION INTRAVENOUS at 14:16

## 2022-01-01 RX ADMIN — APIXABAN 2.5 MILLIGRAM(S): 2.5 TABLET, FILM COATED ORAL at 21:10

## 2022-01-01 RX ADMIN — Medication 10 MILLIGRAM(S): at 14:51

## 2022-01-01 RX ADMIN — Medication 400 MILLIGRAM(S): at 21:13

## 2022-01-01 RX ADMIN — Medication 81 MILLIGRAM(S): at 12:24

## 2022-01-01 RX ADMIN — APIXABAN 2.5 MILLIGRAM(S): 2.5 TABLET, FILM COATED ORAL at 18:09

## 2022-01-01 RX ADMIN — Medication 200 MICROGRAM(S): at 05:50

## 2022-01-01 RX ADMIN — LEVETIRACETAM 1250 MILLIGRAM(S): 250 TABLET, FILM COATED ORAL at 11:38

## 2022-01-01 RX ADMIN — CARVEDILOL PHOSPHATE 6.25 MILLIGRAM(S): 80 CAPSULE, EXTENDED RELEASE ORAL at 18:08

## 2022-01-01 RX ADMIN — CARVEDILOL PHOSPHATE 6.25 MILLIGRAM(S): 80 CAPSULE, EXTENDED RELEASE ORAL at 05:50

## 2022-01-01 RX ADMIN — Medication 81 MILLIGRAM(S): at 11:38

## 2022-01-01 RX ADMIN — Medication 30 MILLIGRAM(S): at 15:53

## 2022-01-01 RX ADMIN — CARVEDILOL PHOSPHATE 3.12 MILLIGRAM(S): 80 CAPSULE, EXTENDED RELEASE ORAL at 06:14

## 2022-01-01 RX ADMIN — LEVETIRACETAM 1250 MILLIGRAM(S): 250 TABLET, FILM COATED ORAL at 12:24

## 2022-01-01 RX ADMIN — CHLORHEXIDINE GLUCONATE 1 APPLICATION(S): 213 SOLUTION TOPICAL at 18:09

## 2022-01-01 RX ADMIN — ATORVASTATIN CALCIUM 10 MILLIGRAM(S): 80 TABLET, FILM COATED ORAL at 21:32

## 2022-01-01 RX ADMIN — Medication 400 MILLIGRAM(S): at 11:39

## 2022-01-01 RX ADMIN — LEVETIRACETAM 1500 MILLIGRAM(S): 250 TABLET, FILM COATED ORAL at 21:33

## 2022-01-01 RX ADMIN — CHLORHEXIDINE GLUCONATE 1 APPLICATION(S): 213 SOLUTION TOPICAL at 11:45

## 2022-01-01 RX ADMIN — LEVETIRACETAM 1500 MILLIGRAM(S): 250 TABLET, FILM COATED ORAL at 21:10

## 2022-01-01 RX ADMIN — APIXABAN 2.5 MILLIGRAM(S): 2.5 TABLET, FILM COATED ORAL at 06:14

## 2022-07-21 PROBLEM — M21.372 FOOT DROP, LEFT FOOT: Chronic | Status: ACTIVE | Noted: 2019-11-13

## 2022-07-21 PROBLEM — Z86.2 PERSONAL HISTORY OF DISEASES OF THE BLOOD AND BLOOD-FORMING ORGANS AND CERTAIN DISORDERS INVOLVING THE IMMUNE MECHANISM: Chronic | Status: ACTIVE | Noted: 2019-11-13

## 2022-07-21 NOTE — ED ADULT TRIAGE NOTE - PAIN RATING/NUMBER SCALE (0-10): ACTIVITY
0 Crescentic Intermediate Repair Preamble Text (Leave Blank If You Do Not Want): Undermining was performed with blunt dissection.

## 2022-07-21 NOTE — H&P ADULT - HISTORY OF PRESENT ILLNESS
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 His oncologist recommended he goes to the ED. EMS EKG showed Afib RvR. Patient states he is scheduled for [facility] on august 17th, 2022. Patient has had afib since 10/18/21. Currently on Eliquis and ASA. Not on rate controlled Afib medication ate this time.  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 His oncologist recommended he goes to the ED. EMS EKG showed Afib RvR. Patient states he is scheduled for [facility] on august 17th, 2022. Patient has had afib since 10/18/21. Currently on Eliquis and ASA. Not on rate controlled Afib medication ate this time.Told by oncologist to hold Coreg.

## 2022-07-21 NOTE — H&P ADULT - NSICDXPASTMEDICALHX_GEN_ALL_CORE_FT
show
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

## 2022-07-21 NOTE — ED PROVIDER NOTE - PHYSICAL EXAMINATION
PHYSICAL EXAM:  CONSTITUTIONAL: Well appearing, awake, alert, oriented to person, place, time/situation and in no apparent distress.  HEAD: Atraumatic  EYES: Clear bilaterally, pupils equal, round and reactive to light.  ENMT: Airway patent, Nasal mucosa clear. Mouth with normal mucosa. Uvula is midline.   CARDIAC: Normal rate, regular rhythm. +S1/S2. No murmurs, rubs or gallops.  RESPIRATORY: Breathing unlabored. Breath sounds clear and equal bilaterally.  ABDOMEN:  Soft, nontender, nondistended. No rebound tenderness or guarding.  NEUROLOGICAL: Alert and oriented, no focal deficits, no motor or sensory deficits. CN2-12 intact. Sensation intact x4 extremities.  SKIN: Skin warm and dry. No evidence of rashes or lesions. PHYSICAL EXAM:  CONSTITUTIONAL: Well appearing, awake, alert, oriented to person, place, time/situation and in no apparent distress.  HEAD: Atraumatic  EYES: Clear bilaterally, pupils equal, round and reactive to light.  ENMT: Airway patent, Nasal mucosa clear. Mouth with normal mucosa. Uvula is midline.   CARDIAC: irregular, irregular rhythm. No murmurs, rubs or gallops.  RESPIRATORY: Breathing unlabored. Breath sounds clear and equal bilaterally.  ABDOMEN:  Soft, nontender, nondistended. No rebound tenderness or guarding.  NEUROLOGICAL: Alert and oriented, no focal deficits, no motor or sensory deficits. CN2-12 intact. Sensation intact x4 extremities.  SKIN: Skin warm and dry. No evidence of rashes or lesions.

## 2022-07-21 NOTE — ED PROVIDER NOTE - ATTENDING CONTRIBUTION TO CARE
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

## 2022-07-21 NOTE — H&P ADULT - NSHPPHYSICALEXAM_GEN_ALL_CORE
PHYSICAL EXAMINATION:  Vital Signs Last 24 Hrs  T(C): 37 (21 Jul 2022 13:20), Max: 37 (21 Jul 2022 13:20)  T(F): 98.6 (21 Jul 2022 13:20), Max: 98.6 (21 Jul 2022 13:20)  HR: 105 (21 Jul 2022 14:45) (105 - 145)  BP: 124/87 (21 Jul 2022 14:45) (124/87 - 134/68)  BP(mean): --  RR: 18 (21 Jul 2022 14:45) (18 - 18)  SpO2: 96% (21 Jul 2022 14:45) (95% - 96%)    Parameters below as of 21 Jul 2022 14:45  Patient On (Oxygen Delivery Method): room air      CAPILLARY BLOOD GLUCOSE          GENERAL: NAD, well-groomed, well-developed  HEAD:  atraumatic, normocephalic  EYES: sclera anicteric  ENMT: mucous membranes moist  NECK: supple, No JVD  CHEST/LUNG: clear to auscultation bilaterally; no rales, rhonchi, or wheezing b/l  HEART: irregular, normal S1, S2  ABDOMEN: BS+, soft, ND, NT   EXTREMITIES:  pulses palpable; no clubbing, cyanosis, or edema b/l LEs  NEURO: awake, alert, interactive; moves all extremities  SKIN: no rashes or lesions

## 2022-07-21 NOTE — ED PROVIDER NOTE - CLINICAL SUMMARY MEDICAL DECISION MAKING FREE TEXT BOX
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  He is currently not taking rate control medication but has had chronic Afib since 10/18/21. Patients HR is in the 110's. Ordered labs, imaging, EKG. R/o ACS, infection, tumor lysis syndrome, electrolyte abnormalities. Will follow up with serial exams and re-asses. 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 diaphoresis, lightheadedness likely secondary to Afib RVR.  He is currently not taking rate control medication but has had chronic Afib since 10/18/21. Patients HR is in the 110's. Ordered labs, imaging, EKG. R/o ACS, infection, tumor lysis syndrome, electrolyte abnormalities. Will follow up with serial exams and re-asses.

## 2022-07-21 NOTE — ED PROVIDER NOTE - OBJECTIVE STATEMENT
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 His oncologist recommended he goes to the ED. EMS EKG showed Afib RvR. Patient denies LOC/ F/C/N/V/D/CP/SOB. Patient is a poor historian. He states he takes his medication as prescribed with no new change in dosages. Oncologist states his Promacta dose was reduced to 50mg s/p watchman procedure. Patient states he is scheduled for [facility] on august 13th, 2022. 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 His oncologist recommended he goes to the ED. EMS EKG showed Afib RvR. Patient states he is scheduled for [facility] on august 17th, 2022. Patient has has afib since 10/18/21. Currently on Eliquis and ASA. Not on rate controlled Afib medication ate this time.  Patient denies LOC/ F/C/N/V/D/CP/SOB. 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 His oncologist recommended he goes to the ED. EMS EKG showed Afib RvR. Patient states he is scheduled for [facility] on august 17th, 2022. Patient has had afib since 10/18/21. Currently on Eliquis and ASA. Not on rate controlled Afib medication ate this time.  Patient denies LOC/ F/C/N/V/D/CP/SOB.

## 2022-07-21 NOTE — PATIENT PROFILE ADULT - FALL HARM RISK - HARM RISK INTERVENTIONS

## 2022-07-21 NOTE — H&P ADULT - NSHPREVIEWOFSYSTEMS_GEN_ALL_CORE
REVIEW OF SYSTEMS:    CONSTITUTIONAL: No weakness, fevers or chills  EYES/ENT: No visual changes;  No vertigo or throat pain   NECK: No pain or stiffness  RESPIRATORY: No cough, wheezing, hemoptysis; No shortness of breath  CARDIOVASCULAR: No chest pain + palpitations  GASTROINTESTINAL: No abdominal or epigastric pain. No nausea, vomiting, or hematemesis; No diarrhea or constipation. No melena or hematochezia.  GENITOURINARY: No dysuria, frequency or hematuria  NEUROLOGICAL: No numbness or weakness + lightheadness  SKIN: No itching, burning, rashes, or lesions   All other review of systems is negative unless indicated above.

## 2022-07-21 NOTE — ED PROVIDER NOTE - NS ED ROS FT
ROS:  -Constitutional: Denies fever  -Head: Denies headache  -Eyes: Denies blurry vision  -Cardiovascular: Denies chest pain  -Pulmonary: Denies shortness of breath  -Gastrointestinal: Denies nausea or diarrhea  -Genitourinary: Denies dysuria

## 2022-07-21 NOTE — ED ADULT NURSE NOTE - OBJECTIVE STATEMENT
1335   75 y/o male BIBA from oncology office w/c/o becoming lightheaded and diaphorectic and palpitations. hx of AFIB AND LEUKEMIA. had a rate of 130-140 in afib, on arrival rate now 120 and no c/o. denies n/v/f/c/cp/sob.

## 2022-07-21 NOTE — H&P ADULT - NSHPLABSRESULTS_GEN_ALL_CORE
12.4   8.64  )-----------( 73       ( 2022 14:23 )             39.8           138  |  101  |  16  ----------------------------<  135<H>  4.4   |  25  |  0.99    Ca    8.9      2022 14:23  Phos  4.0       Mg     2.1         TPro  6.2  /  Alb  4.5  /  TBili  0.7  /  DBili  x   /  AST  16  /  ALT  16  /  AlkPhos  54                Urinalysis Basic - ( 2022 15:26 )    Color: Dark Yellow / Appearance: Clear / S.013 / pH: x  Gluc: x / Ketone: Negative  / Bili: Negative / Urobili: Negative   Blood: x / Protein: 30 mg/dL / Nitrite: Negative   Leuk Esterase: Negative / RBC: 2 /hpf / WBC 1 /HPF   Sq Epi: x / Non Sq Epi: 0 /hpf / Bacteria: Negative        PT/INR - ( 2022 14:23 )   PT: 15.9 sec;   INR: 1.37 ratio         PTT - ( 2022 14:23 )  PTT:39.8 sec    < from: Xray Chest 1 View- PORTABLE-Urgent (Xray Chest 1 View- PORTABLE-Urgent .) (22 @ 14:40) >      IMPRESSION:    Mild pulmonary vascular congestion with small bilateral pleural   effusions. Cardiomegaly.    < end of copied text >    EKG A Fib NSST/T

## 2022-07-21 NOTE — H&P ADULT - ASSESSMENT
76 m with    A Fib with RVR  - telemetry  - rate control  - cardiac enzymes  - cardiology evaluation Dr. Rooney  - s/p watchman procedure    Charcot's joint of foot, left  - stable     CLL (chronic lymphocytic leukemia)   - follow with Hem Onc as OTP    Hypothyroidism acquired - s/p thyroidectomy 1975 for papillary cancer of thyroid  - continue supplementation  - follow T3, TSH    Obesity   - Nutrition education    Seizure   - continue Rx    DVT prophylaxis    Further action as per clinical course     d/w patient and wife    Gabriel Dillon MD phone [phone]  76 m with    A Fib with RVR  - telemetry  - rate control  - cardiac enzymes  - cardiology evaluation Dr. Rooney  - s/p watchman procedure  - restart Coreg  - Eliquis    Charcot's joint of foot, left  - stable     CLL (chronic lymphocytic leukemia)   - follow with Hem Onc as OTP    Hypothyroidism acquired - s/p thyroidectomy 1975 for papillary cancer of thyroid  - continue supplementation  - follow T3, TSH    Obesity   - Nutrition education    Seizure   - continue Rx    DVT prophylaxis    Further action as per clinical course     d/w patient and wife    Gabriel Dillon MD phone [phone]

## 2022-07-21 NOTE — H&P ADULT - NSHPSOCIALHISTORY_GEN_ALL_CORE
Social History:    Marital Status:  (  x )    (   ) Single    (   )    (  )   Occupation:   Lives with: (  ) alone  (  ) children   ( x ) spouse   (  ) parents  (  ) other    Substance Use (street drugs): ( x ) never used  (  ) other:  Tobacco Usage:  (   ) never smoked   (  x ) former smoker   (   ) current smoker  (     ) pack years  (        ) last cigarette date  Alcohol Usage: denies    (     ) Advanced Directives: (     ) None    (      ) DNR    (     ) DNI    (     ) Health Care Proxy:

## 2022-07-22 NOTE — PHARMACOTHERAPY INTERVENTION NOTE - COMMENTS
Performed medication reconciliation and home medication list updated in outpatient medication review. Medications verified with patient and his wife, Ashlyn, who is a RN. All meds from [facility], [facility]     Home Medications:  acyclovir 200 mg oral capsule: 2 cap(s) orally once a day   aspirin 81 mg oral tablet, chewable: 1 tab(s) orally once a day   Eliquis 2.5 mg oral tablet: 1 tab(s) orally 2 times a day - placed on Eliquis and aspirin after Watchman Procedure  ferrous sulfate 325 mg (65 mg elemental iron) oral tablet: 1 tab(s) orally once a day (at bedtime)  levETIRAcetam 500 mg oral tablet: 2.5 tab(s) orally once a day (in the morning)  --> 1250mg IN MORNING  levETIRAcetam 500 mg oral tablet: 3 tab(s) orally once a day (at bedtime) --> 1500mg IN EVENING  levothyroxine 200 mcg (0.2 mg) oral tablet: 1 tab(s) orally once a day   venetoclax 10 mg oral tablet: 4 tab(s) orally once a day   Promacta 50mg daily  Vitamin B12: 1000 microgram(s) sublingual once a day   Vitamin C 1000 mg oral tablet: 1 tab(s) orally 2 times a day   Vitamin D3 50,000 intl units (1250 mcg) oral capsule: 1 tab(s) orally once a week on Mondays    Added:   atorvastatin 10 mg oral tablet: 1 tab(s) orally once a day     Per wife, pt no longer on lisinopril - dc a long time ago. Carvedilol started by FARRUKH VILLAR, however told by oncologist and cardiologist to hold since pt was lightheadess and was worried it would drop his BP.     Please refer to specifics in home medication list (outpatient medication review).    Recommendations: Recommended to resume atorvastatin. LFTs wnl and pt has hx of CVA.     Kelli Dong, PharmD, [facility]  Clinical Pharmacy Specialist  [phone] or Teams

## 2022-07-22 NOTE — PROGRESS NOTE ADULT - SUBJECTIVE AND OBJECTIVE BOX
Patient is a 76y old  Male who presents with a chief complaint of Diaphoresis, dizziness (2022 08:12)      SUBJECTIVE / OVERNIGHT EVENTS: feels better.   Review of Systems  chest pain no  palpitations no  sob no  nausea no  headache no    MEDICATIONS  (STANDING):  acyclovir   Oral Tab/Cap 400 milliGRAM(s) Oral daily  apixaban 2.5 milliGRAM(s) Oral two times a day  aspirin  chewable 81 milliGRAM(s) Oral daily  atorvastatin 10 milliGRAM(s) Oral at bedtime  carvedilol 6.25 milliGRAM(s) Oral every 12 hours  chlorhexidine 2% Cloths 1 Application(s) Topical <User Schedule>  levETIRAcetam 1500 milliGRAM(s) Oral at bedtime  levETIRAcetam 1250 milliGRAM(s) Oral daily  levothyroxine 200 MICROGram(s) Oral daily    MEDICATIONS  (PRN):  acetaminophen     Tablet .. 650 milliGRAM(s) Oral every 6 hours PRN Temp greater or equal to 38.5C (101.3F), Mild Pain (1 - 3)      Vital Signs Last 24 Hrs  T(C): 36.8 (2022 11:33), Max: 37.1 (2022 23:30)  T(F): 98.3 (2022 11:33), Max: 98.7 (2022 23:30)  HR: 83 (2022 11:33) (63 - 100)  BP: 124/80 (2022 11:33) (115/69 - 136/85)  BP(mean): --  RR: 18 (2022 11:33) (18 - 18)  SpO2: 96% (2022 11:33) (92% - 97%)    Parameters below as of 2022 11:33  Patient On (Oxygen Delivery Method): room air        PHYSICAL EXAM:  GENERAL: NAD, well-developed  HEAD:  Atraumatic, Normocephalic  EYES: EOMI, PERRLA, conjunctiva and sclera clear  NECK: Supple, No JVD  CHEST/LUNG: Clear to auscultation bilaterally; No wheeze  HEART: Regular rate and rhythm; No murmurs, rubs, or gallops  ABDOMEN: Soft, Nontender, Nondistended; Bowel sounds present  EXTREMITIES:  2+ Peripheral Pulses, No clubbing, cyanosis, or edema  PSYCH: AAOx3  NEUROLOGY: non-focal  SKIN: No rashes or lesions    LABS:                        12.4   8.64  )-----------( 73       ( 2022 14:23 )             39.8     07-    138  |  101  |  16  ----------------------------<  135<H>  4.4   |  25  |  0.99    Ca    8.9      2022 14:23  Phos  4.0       Mg     2.1         TPro  6.2  /  Alb  4.5  /  TBili  0.7  /  DBili  x   /  AST  16  /  ALT  16  /  AlkPhos  54  -    PT/INR - ( 2022 14:23 )   PT: 15.9 sec;   INR: 1.37 ratio         PTT - ( 2022 14:23 )  PTT:39.8 sec  CARDIAC MARKERS ( 2022 15:16 )  x     / x     / 57 U/L / x     / 3.8 ng/mL      Urinalysis Basic - ( 2022 15:26 )    Color: Dark Yellow / Appearance: Clear / S.013 / pH: x  Gluc: x / Ketone: Negative  / Bili: Negative / Urobili: Negative   Blood: x / Protein: 30 mg/dL / Nitrite: Negative   Leuk Esterase: Negative / RBC: 2 /hpf / WBC 1 /HPF   Sq Epi: x / Non Sq Epi: 0 /hpf / Bacteria: Negative        Culture - Urine (collected 2022 15:26)  Source: Clean Catch Clean Catch (Midstream)  Final Report (2022 17:59):    <10,000 CFU/mL Normal Urogenital Laxmi    Telemetry A Fib 100    RADIOLOGY & ADDITIONAL TESTS:    Imaging Personally Reviewed:    Consultant(s) Notes Reviewed:      Care Discussed with Consultants/Other Providers:
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
## 2022-07-22 NOTE — CONSULT NOTE ADULT - ASSESSMENT
patient with known afib followed by oncology s/p recent watchman yesterday with palpitations and afib rapid VR. Presently hemodynamicallys table    1. chronic afiib now with increase VR  2. s/p watchman  3. hemodynamically stable    Recommend  increase coreg to 6.25 BID  metoprolol may ultimately be better choice  when HR controlled. discharge home  followup with Dr. isaac and EP at [facility]

## 2022-07-22 NOTE — CONSULT NOTE ADULT - NSCONSULTADDITIONALINFOA_GEN_ALL_CORE
Christopher Finnegan MD  [facility]  Hematology-Oncology   [phone]     message left at Dr. Fishman' service to discuss the case.

## 2022-07-22 NOTE — CONSULT NOTE ADULT - SUBJECTIVE AND OBJECTIVE BOX
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 His oncologist recommended he goes to the ED. EMS EKG showed Afib RvR. Patient states he is scheduled for [facility] on august 17th, 2022. Patient has had afib since 10/18/21. Currently on Eliquis and ASA. Not on rate controlled Afib medication ate this time.Told by oncologist to hold Coreg.   Presently feels well still afib VR   echo 2020 normal LV function  cath 2014 normal coronaries  MEDICATIONS:  MEDICATIONS  (STANDING):    acyclovir   Oral Tab/Cap 400 milliGRAM(s) Oral daily  apixaban 2.5 milliGRAM(s) Oral two times a day  aspirin  chewable 81 milliGRAM(s) Oral daily  carvedilol 3.125 milliGRAM(s) Oral every 12 hours  chlorhexidine 2% Cloths 1 Application(s) Topical <User Schedule>  levETIRAcetam 1500 milliGRAM(s) Oral at bedtime  levETIRAcetam 1250 milliGRAM(s) Oral daily  levothyroxine 200 MICROGram(s) Oral daily      PHYSICAL EXAM:  T(C): 36.8 (07-22-22 @ 11:33), Max: 37.1 (07-21-22 @ 23:30)  HR: 83 (07-22-22 @ 11:33) (63 - 104)  BP: 124/80 (07-22-22 @ 11:33) (115/69 - 136/85)  RR: 18 (07-22-22 @ 11:33) (18 - 18)  SpO2: 96% (07-22-22 @ 11:33) (92% - 97%)  Wt(kg): --  I&O's Summary    21 Jul 2022 07:01  -  22 Jul 2022 07:00  --------------------------------------------------------  IN: 60 mL / OUT: 300 mL / NET: -240 mL    22 Jul 2022 07:01  -  22 Jul 2022 17:32  --------------------------------------------------------  IN: 720 mL / OUT: 0 mL / NET: 720 mL      Height (cm): 193 (07-21 @ 23:30)  Weight (kg): 104 (07-21 @ 23:30)  BMI (kg/m2): 27.9 (07-21 @ 23:30)  BSA (m2): 2.35 (07-21 @ 23:30)    Appearance: Normal	  HEENT:   Normal oral mucosa, PERRL, EOMI	  Cardiovascular: Normal S1 S2, No JVD, No murmurs ,  Respiratory: Lungs clear to auscultation, normal effort 	  Gastrointestinal:  Soft, Non-tender, + BS	  Skin: No rashes, No ecchymoses, No cyanosis, warm to touch  Musculoskeletal: Normal range of motion, normal strength  Psychiatry:  Mood & affect appropriate  Ext: No edema  Peripheral pulses palpable 2+ bilaterally      LABS:    CARDIAC MARKERS:  CARDIAC MARKERS ( 21 Jul 2022 15:16 )  x     / x     / 57 U/L / x     / 3.8 ng/mL                                12.4   8.64  )-----------( 73       ( 21 Jul 2022 14:23 )             39.8     07-21    138  |  101  |  16  ----------------------------<  135<H>  4.4   |  25  |  0.99    Ca    8.9      21 Jul 2022 14:23  Phos  4.0     07-21  Mg     2.1     07-21    TPro  6.2  /  Alb  4.5  /  TBili  0.7  /  DBili  x   /  AST  16  /  ALT  16  /  AlkPhos  54  07-21    proBNP:   Lipid Profile:   HgA1c:   TSH: Thyroid Stimulating Hormone, Serum: 3.72 uIU/mL (07-22 @ 06:42)            TELEMETRY: 	    ECG: afib  RBBB	  RADIOLOGY:     < from: TTE with Doppler (w/Cont) (12.13.20 @ 13:16) >  ------------------------------------------------------------------------  Conclusions:  1. Normal left ventricular internal dimensions and wall  thicknesses.  2. Normal left ventricular systolic function. No segmental  wall motion abnormalities.   Endocardial visualization  enhanced with intravenous injection of Ultrasonic Enhancing  Agent (Definity).  3. Estimated pulmonary artery systolic pressure equals 37  mm Hg, assuming right atrial pressure equals 8 mm Hg,  consistent with borderline pulmonary pressures.    *** No previous Echo exam.  ------------------------------------------------------------------------  Confirmed on  12/13/2020 - 15:00:45 by ELISEO Alan  ------------------------------------------------------------------------  < from: Cardiac Cath Lab (06.06.14 @ 14:31) >  VENTRICLES: No left ventriculogram was performed.  CORONARY VESSELS: The coronary circulation is right dominant.  LM:   --  LM: Normal.  LAD:   --  LAD: Normal.  CX:   --  Circumflex: Normal.  RI:   --  Ramus intermedius: Normal.  RCA:   --  RCA: Normal.  COMPLICATIONS: There were no complications.  DIAGNOSTIC RECOMMENDATIONS: Medical management is recommended. Further care  per primary care team.  Prepared and signed by  Bruce Flowers M.D.  Signed 06/06/2014 15:22:09  HEMODYNAMIC TABLES  Pressures:  Baseline  Pressures:  - HR: 80  Pressures:  - Rhythm:  Pressures:  -- Aortic Pressure (S/D/M): 179/94/133  Pressures:  -- Left Ventricle (s/edp): 171/21/--    < from: Xray Chest 1 View- PORTABLE-Urgent (Xray Chest 1 View- PORTABLE-Urgent .) (07.21.22 @ 14:40) >  IMPRESSION:    Mild pulmonary vascular congestion with small bilateral pleural   effusions. Cardiomegaly.    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 His oncologist recommended he goes to the ED. EMS EKG showed Afib RvR. Patient states he is scheduled for [facility] on august 17th, 2022. Patient has had afib since 10/18/21. Currently on Eliquis and ASA. Not on rate controlled Afib medication ate this time.Told by oncologist to hold Coreg.   Presently feels well still afib VR   echo 2020 normal LV function  cath 2014 normal coronaries  s/p watchman 4 weeks ago  being evaluatfor ablation at [place]  MEDICATIONS:  MEDICATIONS  (STANDING):    acyclovir   Oral Tab/Cap 400 milliGRAM(s) Oral daily  apixaban 2.5 milliGRAM(s) Oral two times a day  aspirin  chewable 81 milliGRAM(s) Oral daily  carvedilol 3.125 milliGRAM(s) Oral every 12 hours  chlorhexidine 2% Cloths 1 Application(s) Topical <User Schedule>  levETIRAcetam 1500 milliGRAM(s) Oral at bedtime  levETIRAcetam 1250 milliGRAM(s) Oral daily  levothyroxine 200 MICROGram(s) Oral daily      PHYSICAL EXAM:  T(C): 36.8 (07-22-22 @ 11:33), Max: 37.1 (07-21-22 @ 23:30)  HR: 83 (07-22-22 @ 11:33) (63 - 104)  BP: 124/80 (07-22-22 @ 11:33) (115/69 - 136/85)  RR: 18 (07-22-22 @ 11:33) (18 - 18)  SpO2: 96% (07-22-22 @ 11:33) (92% - 97%)  Wt(kg): --  I&O's Summary    21 Jul 2022 07:01  -  22 Jul 2022 07:00  --------------------------------------------------------  IN: 60 mL / OUT: 300 mL / NET: -240 mL    22 Jul 2022 07:01  -  22 Jul 2022 17:32  --------------------------------------------------------  IN: 720 mL / OUT: 0 mL / NET: 720 mL      Height (cm): 193 (07-21 @ 23:30)  Weight (kg): 104 (07-21 @ 23:30)  BMI (kg/m2): 27.9 (07-21 @ 23:30)  BSA (m2): 2.35 (07-21 @ 23:30)    Appearance: Normal looks well NAD  HEENT:   Normal oral mucosa, PERRL, EOMI	  Cardiovascular: Normal S1 S2, No JVD, No murmurs , irregularly irregular  Respiratory: Lungs clear to auscultation, normal effort 	  Gastrointestinal:  Soft, Non-tender, + BS	  Skin: No rashes, No ecchymoses, No cyanosis, warm to touch  Musculoskeletal: Normal range of motion, normal strength  Psychiatry:  Mood & affect appropriate  Ext: No edema    LABS:    CARDIAC MARKERS:  CARDIAC MARKERS ( 21 Jul 2022 15:16 )  x     / x     / 57 U/L / x     / 3.8 ng/mL                                12.4   8.64  )-----------( 73       ( 21 Jul 2022 14:23 )             39.8     07-21    138  |  101  |  16  ----------------------------<  135<H>  4.4   |  25  |  0.99    Ca    8.9      21 Jul 2022 14:23  Phos  4.0     07-21  Mg     2.1     07-21    TPro  6.2  /  Alb  4.5  /  TBili  0.7  /  DBili  x   /  AST  16  /  ALT  16  /  AlkPhos  54  07-21    proBNP:   Lipid Profile:   HgA1c:   TSH: Thyroid Stimulating Hormone, Serum: 3.72 uIU/mL (07-22 @ 06:42)            TELEMETRY: 	    ECG: afib  RBBB	  RADIOLOGY:     < from: TTE with Doppler (w/Cont) (12.13.20 @ 13:16) >  ------------------------------------------------------------------------  Conclusions:  1. Normal left ventricular internal dimensions and wall  thicknesses.  2. Normal left ventricular systolic function. No segmental  wall motion abnormalities.   Endocardial visualization  enhanced with intravenous injection of Ultrasonic Enhancing  Agent (Definity).  3. Estimated pulmonary artery systolic pressure equals 37  mm Hg, assuming right atrial pressure equals 8 mm Hg,  consistent with borderline pulmonary pressures.    *** No previous Echo exam.  ------------------------------------------------------------------------  Confirmed on  12/13/2020 - 15:00:45 by ELISEO Alan  ------------------------------------------------------------------------  < from: Cardiac Cath Lab (06.06.14 @ 14:31) >  VENTRICLES: No left ventriculogram was performed.  CORONARY VESSELS: The coronary circulation is right dominant.  LM:   --  LM: Normal.  LAD:   --  LAD: Normal.  CX:   --  Circumflex: Normal.  RI:   --  Ramus intermedius: Normal.  RCA:   --  RCA: Normal.  COMPLICATIONS: There were no complications.  DIAGNOSTIC RECOMMENDATIONS: Medical management is recommended. Further care  per primary care team.  Prepared and signed by  Bruce Flowers M.D.  Signed 06/06/2014 15:22:09  HEMODYNAMIC TABLES  Pressures:  Baseline  Pressures:  - HR: 80  Pressures:  - Rhythm:  Pressures:  -- Aortic Pressure (S/D/M): 179/94/133  Pressures:  -- Left Ventricle (s/edp): 171/21/--    < from: Xray Chest 1 View- PORTABLE-Urgent (Xray Chest 1 View- PORTABLE-Urgent .) (07.21.22 @ 14:40) >  IMPRESSION:    Mild pulmonary vascular congestion with small bilateral pleural   effusions. Cardiomegaly.    --- End of Report ---    < end of copied text >

## 2022-07-23 NOTE — DISCHARGE NOTE PROVIDER - HOSPITAL COURSE
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 His oncologist recommended he goes to the ED. EMS EKG showed Afib RvR. Patient states he is scheduled for [facility] on august 17th, 2022. Patient has had afib since 10/18/21. Currently on Eliquis and ASA. Not on rate controlled Afib medication ate this time.    Dx:	Afib RVR (Coreg/ Eliquis); s/p Watchman 6/21/2021     A Fib with RVR  - rate control  - cardiology evaluation Dr. 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

## 2022-07-23 NOTE — PROGRESS NOTE ADULT - SUBJECTIVE AND OBJECTIVE BOX
Patient is a 76y old  Male who presents with a chief complaint of Diaphoresis, dizziness (22 Jul 2022 08:12)      SUBJECTIVE / OVERNIGHT EVENTS: Comfortable without new complaints.   Review of Systems  chest pain no  palpitations no  sob no  nausea no  headache no    MEDICATIONS  (STANDING):  acyclovir   Oral Tab/Cap 400 milliGRAM(s) Oral daily  apixaban 2.5 milliGRAM(s) Oral two times a day  aspirin  chewable 81 milliGRAM(s) Oral daily  atorvastatin 10 milliGRAM(s) Oral at bedtime  carvedilol 6.25 milliGRAM(s) Oral every 12 hours  chlorhexidine 2% Cloths 1 Application(s) Topical <User Schedule>  levETIRAcetam 1500 milliGRAM(s) Oral at bedtime  levETIRAcetam 1250 milliGRAM(s) Oral daily  levothyroxine 200 MICROGram(s) Oral daily    MEDICATIONS  (PRN):  acetaminophen     Tablet .. 650 milliGRAM(s) Oral every 6 hours PRN Temp greater or equal to 38.5C (101.3F), Mild Pain (1 - 3)      Vital Signs Last 24 Hrs  T(C): 36.9 (23 Jul 2022 11:49), Max: 36.9 (23 Jul 2022 11:49)  T(F): 98.5 (23 Jul 2022 11:49), Max: 98.5 (23 Jul 2022 11:49)  HR: 86 (23 Jul 2022 11:49) (86 - 101)  BP: 124/81 (23 Jul 2022 11:49) (116/68 - 124/81)  BP(mean): --  RR: 18 (23 Jul 2022 11:49) (18 - 18)  SpO2: 97% (23 Jul 2022 11:49) (93% - 97%)    Parameters below as of 23 Jul 2022 11:49  Patient On (Oxygen Delivery Method): room air        PHYSICAL EXAM:  GENERAL: NAD, well-developed  HEAD:  Atraumatic, Normocephalic  EYES: EOMI, PERRLA, conjunctiva and sclera clear  NECK: Supple, No JVD  CHEST/LUNG: Clear to auscultation bilaterally; No wheeze  HEART: irregular rate and rhythm; No murmurs, rubs, or gallops  ABDOMEN: Soft, Nontender, Nondistended; Bowel sounds present  EXTREMITIES:  2+ Peripheral Pulses, No clubbing, cyanosis, or edema  PSYCH: AAOx3  NEUROLOGY: non-focal  SKIN: No rashes or lesions    LABS:                        12.5   9.48  )-----------( 69       ( 23 Jul 2022 07:42 )             40.1                     Culture - Urine (collected 21 Jul 2022 15:26)  Source: Clean Catch Clean Catch (Midstream)  Final Report (22 Jul 2022 17:59):    <10,000 CFU/mL Normal Urogenital Laxmi    Telemetry A Fib 70-90    RADIOLOGY & ADDITIONAL TESTS:    Imaging Personally Reviewed:    Consultant(s) Notes Reviewed:      Care Discussed with Consultants/Other Providers:

## 2022-07-23 NOTE — CHART NOTE - NSCHARTNOTEFT_GEN_A_CORE
Discharge Note:    Requested by Dr. Dillon to facilitate d/c home.  V/s, labs, diagnostics reviewed, pt stable to d/c now.  Medication reconciliation reviewed, revised, and resolved with Dr. Dillon who medically cleared w/ f/u as directed.   Please refer to d/c note for hospital details.    Jazmine Hall, NP-c  [id number]

## 2022-07-23 NOTE — DISCHARGE NOTE PROVIDER - NSDCMRMEDTOKEN_GEN_ALL_CORE_FT
acyclovir 200 mg oral capsule: 2 cap(s) orally once a day  aspirin 81 mg oral tablet, chewable: 1 tab(s) orally once a day  atorvastatin 10 mg oral tablet: 1 tab(s) orally once a day  carvedilol 6.25 mg oral tablet: 1 tab(s) orally every 12 hours  Eliquis 2.5 mg oral tablet: 1 tab(s) orally 2 times a day  ferrous sulfate 325 mg (65 mg elemental iron) oral tablet: 1 tab(s) orally once a day (at bedtime)  levETIRAcetam 500 mg oral tablet: 2.5 tab(s) orally once a day (in the morning)  levETIRAcetam 500 mg oral tablet: 3 tab(s) orally once a day (at bedtime)  levothyroxine 200 mcg (0.2 mg) oral tablet: 1 tab(s) orally once a day  venetoclax 10 mg oral tablet: 4 tab(s) orally once a day  Vitamin B12: 1000 microgram(s) sublingual once a day  Vitamin C 1000 mg oral tablet: 1 tab(s) orally 2 times a day  Vitamin D3 50,000 intl units (1250 mcg) oral capsule: 1 tab(s) orally once a week on Mondays

## 2022-07-23 NOTE — PROGRESS NOTE ADULT - ASSESSMENT
76 m with    A Fib with RVR  - telemetry  - rate control  - cardiology evaluation Dr. 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 
76 m with    A Fib with RVR  - telemetry  - rate controlled  - cardiology evaluation Dr. 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 Follow with PMD/ Oncology/ Cardiology in 3-4 days.     d/w patient QA    Gabriel Dillon MD phone [phone]

## 2022-07-23 NOTE — DISCHARGE NOTE PROVIDER - NSDCCPCAREPLAN_GEN_ALL_CORE_FT
normal
PRINCIPAL DISCHARGE DIAGNOSIS  Diagnosis: Atrial fibrillation  Assessment and Plan of Treatment: Atrial fibrillation is the most common heart rhythm problem.  The condition puts you at risk for has stroke and heart attack  It helps if you control your blood pressure, not drink more than 1-2 alcohol drinks per day, cut down on caffeine, getting treatment for over active thyroid gland, and get regular exercise  Call your doctor if you feel your heart racing or beating unusually, chest tightness or pain, lightheaded, faint, shortness of breath especially with exercise  It is important to take your heart medication as prescribed  You may be on anticoagulation which is very important to take as directed - you may need blood work to monitor drug levels

## 2022-07-23 NOTE — DISCHARGE NOTE NURSING/CASE MANAGEMENT/SOCIAL WORK - NSDCPEFALRISK_GEN_ALL_CORE
For information on Fall & Injury Prevention, visit: [email or web address] OR  [email or web address] OR  https://www.cdc.gov/steadi/patient.html

## 2022-07-23 NOTE — DISCHARGE NOTE PROVIDER - NSDCFUADDAPPT_GEN_ALL_CORE_FT
Follow-up with your Electrophysiologist (Dr. Cordova) in one week     APPTS ARE READY TO BE MADE: [X] YES    Best Family or Patient Contact (if needed):    Additional Information about above appointments (if needed):    1: Dr. Cordova	  2: Dr. Guillen  3:     Other comments or requests:    Follow-up with your Electrophysiologist (Dr. Cordova) in one week     APPTS ARE READY TO BE MADE: [X] YES    Best Family or Patient Contact (if needed):    Additional Information about above appointments (if needed):    1: Dr. Cordova	  2: Dr. Guillen  3: Patient was provided with (doctors name and information) and was advised to call to schedule follow up within specified time frame. At this time patient declined scheduling assistance    Other comments or requests:

## 2022-07-23 NOTE — DISCHARGE NOTE PROVIDER - CARE PROVIDER_API CALL
Haile Guillen)  Internal Medicine; Nephrology  [address], [address]  [place] [address]  Phone: [phone]  Fax: [phone]  Follow Up Time: 1 week    KADIE GARNETT  Cardiovascular Diseases  [address] [place] [address]  Phone: [phone]  Fax: [phone]  Follow Up Time:

## 2022-07-23 NOTE — DISCHARGE NOTE NURSING/CASE MANAGEMENT/SOCIAL WORK - PATIENT PORTAL LINK FT
You can access the FollowMyHealth Patient Portal offered by [facility] by registering at the following website: [email or web address]. By joining [employer]’s FollowMyHealth portal, you will also be able to view your health information using other applications (apps) compatible with our system.

## 2022-10-21 NOTE — ED ADULT NURSE NOTE - NSFALLRSKPASTHIST_ED_ALL_ED
Ruthie Phillips is a 76 y.o. female, evaluated via audio-only technology on 10/21/2022 for No chief complaint on file. .    Assessment & Plan:   Iron deficiency anemia:   -- She was diagnosed in July of 2017. She continues to take the oral iron supplement once daily. -- She reported her last colonoscopy was 2 years ago, She also reported that she did have a cologuard done in 2021 and that was negative. --  Recent CBC, CMP, Iron profile, ferritin reviewed with patient today . 10/07/2022 H/H 11.4/38.0,  PLTs 389K, Iron sat 36%, Ferritin 428.  -- She will f/u GI as directed. -- Continue lab check CBC, CMP, Iron profile, ferritin, ret count. Reactive thrombocytosis, improved:   -- 10/7/2022  CBC reviewed with patient today. PLT is stable at 389K, WNL.           -- We will see the patient back in clinic in about 6 months. Always sooner if required. Subjective:   Mrs. Sarmad Beard is a 79-year-old woman who was seen for management of her reactive thrombocytosis and iron deficiency anemia. She was diagnosed in July of 2017. She continues to take the oral iron supplement once daily. Today she denies weakness, fatigue, and dizziness. She denies chest pain or any discomfort. She has no complaints of constipation or abdominal cramps. She denies any blood in her stool or urine. She has no other complaints or concerns to report. Prior to Admission medications    Medication Sig Start Date End Date Taking? Authorizing Provider   iron polysaccharides (FERREX 150) 150 mg iron capsule Take 1 Cap by mouth two (2) times a day. 5/19/19   King Leo MD   metFORMIN (GLUCOPHAGE) 500 mg tablet Take  by mouth two (2) times daily (with meals). Provider, Historical   losartan (COZAAR) 50 mg tablet Take 50 mg by mouth daily. Provider, Historical   atorvastatin (LIPITOR) 40 mg tablet Take 40 mg by mouth nightly. Provider, Historical   NIFEdipine ER (ADALAT CC) 60 mg ER tablet Take 60 mg by mouth daily. Provider, Historical   ferrous sulfate 325 mg (65 mg iron) tablet Take  by mouth Daily (before breakfast). Provider, Historical   fexofenadine (ALLEGRA) 180 mg tablet Take  by mouth daily as needed for Allergies. Provider, Historical   meloxicam (MOBIC) 15 mg tablet Take 15 mg by mouth as needed for Pain. Provider, Historical         Review of Systems   Constitutional: Negative. HENT: Negative. Eyes: Negative. Respiratory: Negative. Cardiovascular: Negative. Gastrointestinal: Negative. Genitourinary: Negative. Musculoskeletal: Negative. Skin: Negative. Neurological: Negative. Endo/Heme/Allergies: Negative. Psychiatric/Behavioral: Negative. Patient-Reported Systolic (Top): 253  Patient-Reported Diastolic (Bottom): 80  Patient-Reported Weight: 757UV       Dinesh Garcia was evaluated through a patient-initiated, synchronous (real-time) audio only encounter. She (or guardian if applicable) is aware that it is a billable service, which includes applicable co-pays, with coverage as determined by her insurance carrier. This visit was conducted with the patient's (and/or Dick Cruz guardian's) verbal consent. She has not had a related appointment within my department in the past 7 days or scheduled within the next 24 hours. The patient was located in a state where the provider was licensed to provide care.       Total Time: minutes: 11-20 minutes    Gerhard Saldaña DNP no

## 2022-11-04 NOTE — CONSULT NOTE ADULT - ASSESSMENT
Fred Langston thx!  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 His oncologist recommended he goes to the ED. EMS EKG showed Afib RvR. Patient states he is scheduled for [facility] on august 17th, 2022. Patient has had afib since 10/18/21. Currently on Eliquis and ASA. Not on rate controlled Afib medication ate this time.Told by oncologist to hold Coreg. (  office patient   Impression: prior embolic stroke from AF 2) pre syncope 2/2 AF and possible vasovagal  3) seizures  - c/w eliquis  but dose should be 5mg BID (age < 80 and weight > 60kg and Cr normal)  and statin therpay for secondary stroke prevention. no need for asa from stroke standpoint if on AC.  he is status post watchman, f/u with cardio if they want low dose eliquis and asa as opposed to full dose eliquis   - c/w keppra 1500/1250 for seziure prevention.   - cardio f/u  - no need for eeg    - telemetry  - check FS, glucose control <180  - GI/DVT ppx  - Counseling on diet, exercise, and medication adherence was done  - Counseling on smoking cessation and alcohol consumption offered when appropriate.  - Pain assessed and judicious use of narcotics when appropriate was discussed.    - Stroke education given when appropriate.  - Importance of fall prevention discussed.   - Differential diagnosis and plan of care discussed with patient and/or family and primary team  - Thank you for allowing me to participate in the care of this patient. Call with questions.   - f/u in office on discharge   Eric Morales MD  Vascular Neurology  Office: [phone]

## 2023-01-01 ENCOUNTER — INPATIENT (INPATIENT)
Facility: HOSPITAL | Age: 78
LOS: 0 days | Discharge: EXPIRED | End: 2023-02-28
Attending: STUDENT IN AN ORGANIZED HEALTH CARE EDUCATION/TRAINING PROGRAM | Admitting: STUDENT IN AN ORGANIZED HEALTH CARE EDUCATION/TRAINING PROGRAM
Payer: MEDICARE

## 2023-01-01 VITALS
SYSTOLIC BLOOD PRESSURE: 110 MMHG | TEMPERATURE: 99 F | HEART RATE: 115 BPM | DIASTOLIC BLOOD PRESSURE: 90 MMHG | OXYGEN SATURATION: 95 % | RESPIRATION RATE: 20 BRPM

## 2023-01-01 DIAGNOSIS — Z96.659 PRESENCE OF UNSPECIFIED ARTIFICIAL KNEE JOINT: Chronic | ICD-10-CM

## 2023-01-01 DIAGNOSIS — Z98.890 OTHER SPECIFIED POSTPROCEDURAL STATES: Chronic | ICD-10-CM

## 2023-01-01 DIAGNOSIS — Z98.49 CATARACT EXTRACTION STATUS, UNSPECIFIED EYE: Chronic | ICD-10-CM

## 2023-01-01 DIAGNOSIS — Z98.89 OTHER SPECIFIED POSTPROCEDURAL STATES: Chronic | ICD-10-CM

## 2023-01-01 DIAGNOSIS — S84.10XA INJURY OF PERONEAL NERVE AT LOWER LEG LEVEL, UNSPECIFIED LEG, INITIAL ENCOUNTER: Chronic | ICD-10-CM

## 2023-01-01 DIAGNOSIS — Z89.422 ACQUIRED ABSENCE OF OTHER LEFT TOE(S): Chronic | ICD-10-CM

## 2023-01-01 DIAGNOSIS — C92.10 CHRONIC MYELOID LEUKEMIA, BCR/ABL-POSITIVE, NOT HAVING ACHIEVED REMISSION: ICD-10-CM

## 2023-01-01 LAB
ALBUMIN SERPL ELPH-MCNC: 2.2 G/DL — LOW (ref 3.3–5)
ALBUMIN SERPL ELPH-MCNC: 2.5 G/DL — LOW (ref 3.3–5)
ALP SERPL-CCNC: 168 U/L — HIGH (ref 40–120)
ALP SERPL-CCNC: 217 U/L — HIGH (ref 40–120)
ALT FLD-CCNC: 1429 U/L — HIGH (ref 4–41)
ALT FLD-CCNC: 150 U/L — HIGH (ref 4–41)
ANION GAP SERPL CALC-SCNC: 21 MMOL/L — HIGH (ref 7–14)
ANION GAP SERPL CALC-SCNC: 31 MMOL/L — HIGH (ref 7–14)
APPEARANCE UR: ABNORMAL
APTT BLD: 36.4 SEC — HIGH (ref 27–36.3)
AST SERPL-CCNC: 1756 U/L — HIGH (ref 4–40)
AST SERPL-CCNC: 183 U/L — HIGH (ref 4–40)
BASE EXCESS BLDV CALC-SCNC: SIGNIFICANT CHANGE UP MMOL/L (ref -2–3)
BASOPHILS # BLD AUTO: 0 K/UL — SIGNIFICANT CHANGE UP (ref 0–0.2)
BASOPHILS # BLD AUTO: 0.47 K/UL — HIGH (ref 0–0.2)
BASOPHILS NFR BLD AUTO: 0 % — SIGNIFICANT CHANGE UP (ref 0–2)
BASOPHILS NFR BLD AUTO: 0.2 % — SIGNIFICANT CHANGE UP (ref 0–2)
BILIRUB SERPL-MCNC: 1 MG/DL — SIGNIFICANT CHANGE UP (ref 0.2–1.2)
BILIRUB SERPL-MCNC: 1.4 MG/DL — HIGH (ref 0.2–1.2)
BILIRUB UR-MCNC: NEGATIVE — SIGNIFICANT CHANGE UP
BLOOD GAS VENOUS COMPREHENSIVE RESULT: SIGNIFICANT CHANGE UP
BUN SERPL-MCNC: 40 MG/DL — HIGH (ref 7–23)
BUN SERPL-MCNC: 49 MG/DL — HIGH (ref 7–23)
CALCIUM SERPL-MCNC: 5.6 MG/DL — CRITICAL LOW (ref 8.4–10.5)
CALCIUM SERPL-MCNC: 8.4 MG/DL — SIGNIFICANT CHANGE UP (ref 8.4–10.5)
CHLORIDE BLDV-SCNC: 99 MMOL/L — SIGNIFICANT CHANGE UP (ref 96–108)
CHLORIDE SERPL-SCNC: 113 MMOL/L — HIGH (ref 98–107)
CHLORIDE SERPL-SCNC: 99 MMOL/L — SIGNIFICANT CHANGE UP (ref 98–107)
CO2 BLDV-SCNC: SIGNIFICANT CHANGE UP MMOL/L (ref 22–26)
CO2 SERPL-SCNC: 10 MMOL/L — CRITICAL LOW (ref 22–31)
CO2 SERPL-SCNC: 9 MMOL/L — CRITICAL LOW (ref 22–31)
COLOR SPEC: YELLOW — SIGNIFICANT CHANGE UP
CREAT SERPL-MCNC: 2.46 MG/DL — HIGH (ref 0.5–1.3)
CREAT SERPL-MCNC: 3.8 MG/DL — HIGH (ref 0.5–1.3)
DIFF PNL FLD: ABNORMAL
EGFR: 16 ML/MIN/1.73M2 — LOW
EGFR: 26 ML/MIN/1.73M2 — LOW
EOSINOPHIL # BLD AUTO: 0.23 K/UL — SIGNIFICANT CHANGE UP (ref 0–0.5)
EOSINOPHIL # BLD AUTO: 1.56 K/UL — HIGH (ref 0–0.5)
EOSINOPHIL NFR BLD AUTO: 0.1 % — SIGNIFICANT CHANGE UP (ref 0–6)
EOSINOPHIL NFR BLD AUTO: 0.9 % — SIGNIFICANT CHANGE UP (ref 0–6)
FLUAV AG NPH QL: SIGNIFICANT CHANGE UP
FLUBV AG NPH QL: SIGNIFICANT CHANGE UP
GAS PNL BLDV: 137 MMOL/L — SIGNIFICANT CHANGE UP (ref 136–145)
GLUCOSE BLDV-MCNC: 54 MG/DL — CRITICAL LOW (ref 70–99)
GLUCOSE SERPL-MCNC: 110 MG/DL — HIGH (ref 70–99)
GLUCOSE SERPL-MCNC: 24 MG/DL — CRITICAL LOW (ref 70–99)
GLUCOSE UR QL: NEGATIVE — SIGNIFICANT CHANGE UP
HCO3 BLDV-SCNC: SIGNIFICANT CHANGE UP MMOL/L (ref 22–29)
HCT VFR BLD CALC: 24.4 % — LOW (ref 39–50)
HCT VFR BLD CALC: 24.8 % — LOW (ref 39–50)
HCT VFR BLDA CALC: SIGNIFICANT CHANGE UP % (ref 39–51)
HGB BLD CALC-MCNC: SIGNIFICANT CHANGE UP G/DL (ref 12.6–17.4)
HGB BLD-MCNC: 6.4 G/DL — CRITICAL LOW (ref 13–17)
HGB BLD-MCNC: 7.2 G/DL — LOW (ref 13–17)
IANC: 34.55 K/UL — HIGH (ref 1.8–7.4)
IANC: 40.12 K/UL — HIGH (ref 1.8–7.4)
IMM GRANULOCYTES NFR BLD AUTO: 21.3 % — HIGH (ref 0–0.9)
INR BLD: 3.18 RATIO — HIGH (ref 0.88–1.16)
KETONES UR-MCNC: NEGATIVE — SIGNIFICANT CHANGE UP
LACTATE BLDV-MCNC: 12.7 MMOL/L — CRITICAL HIGH (ref 0.5–2)
LDH SERPL L TO P-CCNC: HIGH U/L (ref 135–225)
LEUKOCYTE ESTERASE UR-ACNC: ABNORMAL
LYMPHOCYTES # BLD AUTO: 13 % — SIGNIFICANT CHANGE UP (ref 13–44)
LYMPHOCYTES # BLD AUTO: 27.12 K/UL — HIGH (ref 1–3.3)
LYMPHOCYTES # BLD AUTO: 5.6 % — LOW (ref 13–44)
LYMPHOCYTES # BLD AUTO: 9.73 K/UL — HIGH (ref 1–3.3)
MCHC RBC-ENTMCNC: 26.2 GM/DL — LOW (ref 32–36)
MCHC RBC-ENTMCNC: 29 GM/DL — LOW (ref 32–36)
MCHC RBC-ENTMCNC: 29.1 PG — SIGNIFICANT CHANGE UP (ref 27–34)
MCHC RBC-ENTMCNC: 29.1 PG — SIGNIFICANT CHANGE UP (ref 27–34)
MCV RBC AUTO: 100.4 FL — HIGH (ref 80–100)
MCV RBC AUTO: 110.9 FL — HIGH (ref 80–100)
MONOCYTES # BLD AUTO: 16.33 K/UL — HIGH (ref 0–0.9)
MONOCYTES # BLD AUTO: 96.52 K/UL — HIGH (ref 0–0.9)
MONOCYTES NFR BLD AUTO: 46.2 % — HIGH (ref 2–14)
MONOCYTES NFR BLD AUTO: 9.4 % — SIGNIFICANT CHANGE UP (ref 2–14)
NEUTROPHILS # BLD AUTO: 14.59 K/UL — HIGH (ref 1.8–7.4)
NEUTROPHILS # BLD AUTO: 40.12 K/UL — HIGH (ref 1.8–7.4)
NEUTROPHILS NFR BLD AUTO: 19.2 % — LOW (ref 43–77)
NEUTROPHILS NFR BLD AUTO: 7.5 % — LOW (ref 43–77)
NITRITE UR-MCNC: NEGATIVE — SIGNIFICANT CHANGE UP
NRBC # BLD: 1 /100 WBCS — HIGH (ref 0–0)
NRBC # FLD: 2.63 K/UL — HIGH (ref 0–0)
PCO2 BLDV: 78 MMHG — HIGH (ref 42–55)
PH BLDV: <6.9 — LOW (ref 7.32–7.43)
PH UR: 5.5 — SIGNIFICANT CHANGE UP (ref 5–8)
PHOSPHATE SERPL-MCNC: 9.3 MG/DL — HIGH (ref 2.5–4.5)
PLATELET # BLD AUTO: 194 K/UL — SIGNIFICANT CHANGE UP (ref 150–400)
PLATELET # BLD AUTO: 77 K/UL — LOW (ref 150–400)
PO2 BLDV: 33 MMHG — SIGNIFICANT CHANGE UP (ref 25–45)
POTASSIUM BLDV-SCNC: 3.3 MMOL/L — LOW (ref 3.5–5.1)
POTASSIUM SERPL-MCNC: 4.3 MMOL/L — SIGNIFICANT CHANGE UP (ref 3.5–5.3)
POTASSIUM SERPL-MCNC: 4.8 MMOL/L — SIGNIFICANT CHANGE UP (ref 3.5–5.3)
POTASSIUM SERPL-SCNC: 4.3 MMOL/L — SIGNIFICANT CHANGE UP (ref 3.5–5.3)
POTASSIUM SERPL-SCNC: 4.8 MMOL/L — SIGNIFICANT CHANGE UP (ref 3.5–5.3)
PROT SERPL-MCNC: 3.2 G/DL — LOW (ref 6–8.3)
PROT SERPL-MCNC: 3.6 G/DL — LOW (ref 6–8.3)
PROT UR-MCNC: ABNORMAL
PROTHROM AB SERPL-ACNC: 37.3 SEC — HIGH (ref 10.5–13.4)
RBC # BLD: 2.2 M/UL — LOW (ref 4.2–5.8)
RBC # BLD: 2.47 M/UL — LOW (ref 4.2–5.8)
RBC # FLD: 16.7 % — HIGH (ref 10.3–14.5)
RBC # FLD: 18.1 % — HIGH (ref 10.3–14.5)
RSV RNA NPH QL NAA+NON-PROBE: SIGNIFICANT CHANGE UP
SAO2 % BLDV: SIGNIFICANT CHANGE UP % (ref 67–88)
SARS-COV-2 RNA SPEC QL NAA+PROBE: SIGNIFICANT CHANGE UP
SODIUM SERPL-SCNC: 140 MMOL/L — SIGNIFICANT CHANGE UP (ref 135–145)
SODIUM SERPL-SCNC: 143 MMOL/L — SIGNIFICANT CHANGE UP (ref 135–145)
SP GR SPEC: 1.02 — SIGNIFICANT CHANGE UP (ref 1.01–1.05)
TROPONIN T, HIGH SENSITIVITY RESULT: 146 NG/L — CRITICAL HIGH
URATE SERPL-MCNC: 20.8 MG/DL — HIGH (ref 3.4–8.8)
UROBILINOGEN FLD QL: ABNORMAL
WBC # BLD: 173.73 K/UL — CRITICAL HIGH (ref 3.8–10.5)
WBC # BLD: 208.99 K/UL — CRITICAL HIGH (ref 3.8–10.5)
WBC # FLD AUTO: 173.73 K/UL — CRITICAL HIGH (ref 3.8–10.5)
WBC # FLD AUTO: 208.99 K/UL — CRITICAL HIGH (ref 3.8–10.5)

## 2023-01-01 PROCEDURE — 36556 INSERT NON-TUNNEL CV CATH: CPT

## 2023-01-01 PROCEDURE — 99291 CRITICAL CARE FIRST HOUR: CPT

## 2023-01-01 PROCEDURE — 31500 INSERT EMERGENCY AIRWAY: CPT

## 2023-01-01 PROCEDURE — 70450 CT HEAD/BRAIN W/O DYE: CPT | Mod: 26,MA

## 2023-01-01 PROCEDURE — 71045 X-RAY EXAM CHEST 1 VIEW: CPT | Mod: 26

## 2023-01-01 PROCEDURE — 99291 CRITICAL CARE FIRST HOUR: CPT | Mod: 25

## 2023-01-01 RX ORDER — VANCOMYCIN HCL 1 G
1000 VIAL (EA) INTRAVENOUS ONCE
Refills: 0 | Status: COMPLETED | OUTPATIENT
Start: 2023-01-01 | End: 2023-01-01

## 2023-01-01 RX ORDER — NOREPINEPHRINE BITARTRATE/D5W 8 MG/250ML
0.05 PLASTIC BAG, INJECTION (ML) INTRAVENOUS
Qty: 8 | Refills: 0 | Status: DISCONTINUED | OUTPATIENT
Start: 2023-01-01 | End: 2023-01-01

## 2023-01-01 RX ORDER — SODIUM CHLORIDE 9 MG/ML
2000 INJECTION INTRAMUSCULAR; INTRAVENOUS; SUBCUTANEOUS ONCE
Refills: 0 | Status: COMPLETED | OUTPATIENT
Start: 2023-01-01 | End: 2023-01-01

## 2023-01-01 RX ORDER — PIPERACILLIN AND TAZOBACTAM 4; .5 G/20ML; G/20ML
3.38 INJECTION, POWDER, LYOPHILIZED, FOR SOLUTION INTRAVENOUS ONCE
Refills: 0 | Status: COMPLETED | OUTPATIENT
Start: 2023-01-01 | End: 2023-01-01

## 2023-01-01 RX ORDER — VASOPRESSIN 20 [USP'U]/ML
0.04 INJECTION INTRAVENOUS
Qty: 40 | Refills: 0 | Status: DISCONTINUED | OUTPATIENT
Start: 2023-01-01 | End: 2023-01-01

## 2023-01-01 RX ORDER — DEXTROSE 10 % IN WATER 10 %
1000 INTRAVENOUS SOLUTION INTRAVENOUS
Refills: 0 | Status: DISCONTINUED | OUTPATIENT
Start: 2023-01-01 | End: 2023-01-01

## 2023-01-01 RX ORDER — DEXTROSE 50 % IN WATER 50 %
50 SYRINGE (ML) INTRAVENOUS ONCE
Refills: 0 | Status: COMPLETED | OUTPATIENT
Start: 2023-01-01 | End: 2023-01-01

## 2023-01-01 RX ORDER — ACETAMINOPHEN 500 MG
1000 TABLET ORAL ONCE
Refills: 0 | Status: COMPLETED | OUTPATIENT
Start: 2023-01-01 | End: 2023-01-01

## 2023-01-01 RX ORDER — HYDROCORTISONE 20 MG
100 TABLET ORAL ONCE
Refills: 0 | Status: COMPLETED | OUTPATIENT
Start: 2023-01-01 | End: 2023-01-01

## 2023-01-01 RX ORDER — LEVETIRACETAM 250 MG/1
1000 TABLET, FILM COATED ORAL ONCE
Refills: 0 | Status: COMPLETED | OUTPATIENT
Start: 2023-01-01 | End: 2023-01-01

## 2023-01-01 RX ORDER — NOREPINEPHRINE BITARTRATE/D5W 8 MG/250ML
1 PLASTIC BAG, INJECTION (ML) INTRAVENOUS
Qty: 16 | Refills: 0 | Status: DISCONTINUED | OUTPATIENT
Start: 2023-01-01 | End: 2023-01-01

## 2023-01-01 RX ORDER — LEVETIRACETAM 250 MG/1
500 TABLET, FILM COATED ORAL ONCE
Refills: 0 | Status: COMPLETED | OUTPATIENT
Start: 2023-01-01 | End: 2023-01-01

## 2023-01-01 RX ORDER — CHLORHEXIDINE GLUCONATE 213 G/1000ML
15 SOLUTION TOPICAL EVERY 12 HOURS
Refills: 0 | Status: DISCONTINUED | OUTPATIENT
Start: 2023-01-01 | End: 2023-01-01

## 2023-01-01 RX ORDER — FENTANYL CITRATE 50 UG/ML
100 INJECTION INTRAVENOUS ONCE
Refills: 0 | Status: DISCONTINUED | OUTPATIENT
Start: 2023-01-01 | End: 2023-01-01

## 2023-01-01 RX ADMIN — Medication 8.15 MICROGRAM(S)/KG/MIN: at 12:07

## 2023-01-01 RX ADMIN — Medication 50 MILLILITER(S): at 10:17

## 2023-01-01 RX ADMIN — SODIUM CHLORIDE 2000 MILLILITER(S): 9 INJECTION INTRAMUSCULAR; INTRAVENOUS; SUBCUTANEOUS at 10:35

## 2023-01-01 RX ADMIN — Medication 75 MICROGRAM(S)/KG/MIN: at 12:07

## 2023-01-01 RX ADMIN — Medication 400 MILLIGRAM(S): at 10:16

## 2023-01-01 RX ADMIN — FENTANYL CITRATE 100 MICROGRAM(S): 50 INJECTION INTRAVENOUS at 11:00

## 2023-01-01 RX ADMIN — Medication 1000 MILLIGRAM(S): at 10:46

## 2023-01-01 RX ADMIN — FENTANYL CITRATE 100 MICROGRAM(S): 50 INJECTION INTRAVENOUS at 11:30

## 2023-01-01 RX ADMIN — PIPERACILLIN AND TAZOBACTAM 200 GRAM(S): 4; .5 INJECTION, POWDER, LYOPHILIZED, FOR SOLUTION INTRAVENOUS at 10:25

## 2023-01-01 RX ADMIN — Medication 250 MILLIGRAM(S): at 11:58

## 2023-01-01 RX ADMIN — LEVETIRACETAM 400 MILLIGRAM(S): 250 TABLET, FILM COATED ORAL at 10:16

## 2023-01-01 RX ADMIN — Medication 100 MILLILITER(S): at 12:04

## 2023-01-01 RX ADMIN — Medication 100 MILLIGRAM(S): at 10:23

## 2023-01-01 RX ADMIN — LEVETIRACETAM 400 MILLIGRAM(S): 250 TABLET, FILM COATED ORAL at 10:23

## 2023-01-01 NOTE — DISCHARGE NOTE NURSING/CASE MANAGEMENT/SOCIAL WORK - NSDCPEELIQUIS_GEN_ALL_CORE
Wish/Pediatrician
Apixaban/Eliquis - Compliance/Apixaban/Eliquis - Dietary Advice/Apixaban/Eliquis - Follow up monitoring/Apixaban/Eliquis - Potential for adverse drug reactions and interactions

## 2023-02-28 NOTE — ED PROVIDER NOTE - ATTENDING CONTRIBUTION TO CARE
77 year old male with cll, afib, thrombocytopenia on prednisone last plt was 26k, seizure disorder on keppra presents with ams for 1 day.  patient hypoglycemic by ems prior to arrival. 1 liter fluid given prior to arrival, patient obtunded. concern for post ictal stare vs ich vs sepsis vs pna vs uti vs electrolyte abn vs anemia vs blast crisis. will get vbg with lacatet cbc cmp.  gifty give antipyretic broad spectrum abx, will give 2 more liters to complete 30 cc/kg given ibw is 87.  will in ED, patient pH came back at 6.95.  patient became hypotensive. started on pressors. then patient became apneic. intubated emergently. after intubation, patient had pea arrest. acls started. received glucose, calcium, bicarb x 2, epi. rosc achieved. cbc resulted showing acute blast crisis.  I spoke with patient's hematolgist and updated wife. heem consult, micu consult. 77 year old male with cll, afib, thrombocytopenia on prednisone last plt was 26k, seizure disorder on keppra presents with ams for 1 day.  patient hypoglycemic by ems prior to arrival. 1 liter fluid given prior to arrival, patient obtunded. concern for post ictal stare vs ich vs sepsis vs pna vs uti vs electrolyte abn vs anemia vs blast crisis. will get vbg with lacatet cbc cmp.  gifty give antipyretic broad spectrum abx, will give 2 more liters to complete 30 cc/kg given ibw is 87.  will in ED, patient pH came back at 6.95.  fingersticks checked multiple times, ranging from 50's to 90's. glucose given multiple times. patient became hypotensive. started on pressors. then patient became apneic. intubated emergently. after intubation, patient had pea arrest. acls started. received glucose, calcium, bicarb x 2, epi. rosc achieved. cbc resulted showing acute blast crisis.  I spoke with patient's hematolgist and updated wife. heem consult, micu consult.

## 2023-02-28 NOTE — H&P ADULT - ASSESSMENT
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 C/c/b hypotension w/ sinus tachycardia. Intubated for airway protection & started pressors. Transferred to MICU.     =======NEURO=================  #AMS  #r/o hypoglycemia, cardiogenic shock, seizure d/o, infxn  -BL A&O x 3  -Noted seizure d/o on Keppra  -[facility] 2/28: no acute changes  > f/u BMP BID  > c/w Keppra per home meds  > correct cardiogenic shock as below    =======RESPIRATORY============  #Intubated    =======CARDIOVASCULAR=========  #Afib  -s/p loop recorder 2019  -s/p watchman/ablation  -recently dc'd Eliquis    =======GASTROINTESTINAL=======  #  > NPO  > consider tube feeds    =======GENITOURINARY==========  #NAWAF  #r/o pre-renal  -BL Cr ~.8-9; 2.5 on admission  -    =======INFECTIOUS DISEASE=======  #Urosepsis  -UA (+) for bacteria, LEs  > f/u UCx (2/28)  >     =======HEME===================  #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 C/c/b hypotension w/ sinus tachycardia. Intubated for airway protection & started pressors. Transferred to MICU.     =======NEURO=================  #AMS  #r/o hypoglycemia, cardiogenic shock, seizure d/o, infxn  -BL A&O x 3  -[facility] 2/28: no acute changes  > f/u BMP BID  > c/w Keppra for seizure d/o  > correct cardiogenic shock as below    =======RESPIRATORY============  #Airway Protection  -AMS requiring airway protection  > Intubated     =======CARDIOVASCULAR=========  #Afib  -s/p loop recorder 2019  -s/p watchman/ablation  -recently dc'd Eliquis  -noted tachycardia (130s), w/ hypotension  > on levo for hypotension    =======GASTROINTESTINAL=======  #NPO  > consider OG tube placement w/ feeds    #Shock liver  #Transaminitis  -increase in transaminitis (AST/ALT, 1800/1500)  -Alk phos 217  > receiving levo for hypotension    =======GENITOURINARY==========  #NAWAF  #r/o pre-renal  -BL Cr ~.8-9; 2.5 on admission  > on levo  > c/w D5  > consider levo    =======INFECTIOUS DISEASE=======  #Urosepsis  -UA (+) for bacteria, LEs  > f/u UCx (2/28)  > h/o abx    =======HEME===================  #ITP 2/2 CLL  -noted pancytopenia (wbc 2.5, hgb 7.2, plts 164)  > h/o prednisone i/s/o infection    #DVT  #Anemia  -anemia, unknown bleed source  > SCD    =======ENDOCRINE==============  #Hypoglycemia  -glucose 50-60s on admission, now corrects  > c/w D5    =======PROPHYLAXIS=============  -DVT: SCD  -Code Status: DNR/DNI     77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 C/c/b hypotension w/ sinus tachycardia. Intubated for airway protection & started pressors. Transferred to MICU.     =======NEURO=================  #AMS  #r/o hypoglycemia, cardiogenic shock, seizure d/o, infxn  -BL A&O x 3  -CTH 2/28: no acute changes  > f/u BMP BID  > c/w Keppra for seizure d/o  > correct cardiogenic shock as below  > consider abx for infectious etiology    =======RESPIRATORY============  #Airway Protection  -AMS requiring airway protection  > Intubated     =======CARDIOVASCULAR=========  #Cardiogenic Shock  #PEA  -Coded while in ED requiring pressors, intubation   > levo  > fluids    #Afib  -s/p loop recorder 2019  -s/p watchman/ablation  -recently dc'd Eliquis  -noted tachycardia (130s) w/ hypotension  > on levo for hypotension    =======GASTROINTESTINAL=======  #NPO  > consider OG tube placement w/ feeds    #Shock liver  #Transaminitis  -increase in transaminitis (AST/ALT, 1800/1500)  -Alk phos 217  > receiving levo for hypotension  > f/u hepatic US     =======GENITOURINARY==========  #NAWAF  #r/o pre-renal  -BL Cr ~.8-9; 2.5 on admission  > on levo  > c/w D5  > consider levo  > lasix challenge, consider CRRT    =======INFECTIOUS DISEASE=======  #Urosepsis  -UA (+) for bacteria, LEs  > f/u UCx (2/28)  > h/o abx    =======HEME===================  #ITP 2/2 CLL  -noted pancytopenia (wbc 2.5, hgb 7.2, plts 164)  > h/o prednisone i/s/o infection    #DVT  #Anemia  -anemia, unknown bleed source  > SCD    =======ENDOCRINE==============  #Hypoglycemia  -glucose 50-60s on admission, now corrects  > c/w D5    =======PROPHYLAXIS=============  -DVT: SCD  -Code Status: DNR/DNI     77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 C/c/b hypotension w/ sinus tachycardia. Intubated for airway protection & started pressors. Transferred to MICU.     =======NEURO=================  #AMS  #r/o hypoglycemia, cardiogenic shock, seizure d/o, infxn  -BL A&O x 3  -CTH 2/28: no acute changes  > f/u BMP BID  > c/w Keppra for seizure d/o  > correct cardiogenic shock as below  > consider abx for infectious etiology    =======RESPIRATORY============  #Airway Protection  -AMS requiring airway protection  > Intubated, note sedated    =======CARDIOVASCULAR=========  #Cardiogenic Shock  #PEA  -Coded while in ED requiring pressors, intubation   -5 rounds of ACLS prior to ROSC  > levo  > fluids    #Afib  -s/p loop recorder 2019  -s/p watchman/ablation  -recently dc'd Eliquis  -noted tachycardia (130s) w/ hypotension  > on levo for hypotension    =======GASTROINTESTINAL=======  #NPO  > consider OG tube placement w/ feeds    #Shock liver  #Transaminitis  -increase in transaminitis (AST/ALT, 1800/1500)  -Alk phos 217  > receiving levo for hypotension  > f/u hepatic US     =======GENITOURINARY==========  #NAWAF  #r/o pre-renal  -BL Cr ~.8-9; 2.5 on admission  > on levo  > c/w D5  > consider levo  > lasix challenge, consider CRRT    =======INFECTIOUS DISEASE=======  #Urosepsis  -UA (+) for bacteria, LEs  -s/p empiric abx in ED   > f/u UCx (2/28)  > c/w vanc/zosyn     =======HEME===================  #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

## 2023-02-28 NOTE — ED PROVIDER NOTE - CLINICAL SUMMARY MEDICAL DECISION MAKING FREE TEXT BOX
patient presented with ams.  initial differential was post ictal state, ich, subdural, active seizure, sepsis, fever, hypoglycemia. rec'd fluid anmd d50 in field.  will in ED labs, showed severe acidosis and severely elevated wbc with 77% blasts. patient found to be febrile. antipyretics given, abx given. patient became hypotensive and levo was started peripherally. patient then became apneic and was intubated. following intubation patient found to be in pea. acls started eimmediatley. rec'd bicarb epi x 5, dexrose and calcium. patient had 2 shocks for vfib during arrest, amio 150 given.  micu consult, heme consulted for blast crisis.  family updated

## 2023-02-28 NOTE — ED ADULT NURSE NOTE - OBJECTIVE STATEMENT
pt received to room 5, unresponsive accompanied by EMS, on zoll and 100% NRB.  family called 911 because pt was found to be unresponsive with increasing generalized weakness over the past few days.  PMH: a-fib, seizures, CHF, low platelets.  pt found to be hypoglycemic, D50 given per order.  SL x 3 placed, labs sent, skin intact.  pt presents with bruising to upper extremities and left eye.  pt straight cathed for UA, C&S.  pt has left sided gaze, taken to CT, on zoll accompanied by ED resident, assessment ongoing.  report given to primary RN Jasiel

## 2023-02-28 NOTE — ED PROVIDER NOTE - OBJECTIVE STATEMENT
77 year old male with history of ITP secondary to CLL on prednisone, stroke, Afib s/p loop recorder 2019, s/p watchman procedure and ablation, recently dc'd Eliquis, seizure disorder on Keppra presenting with AMS in setting of Afib and hypoglycemia. 77 year old male with history of ITP secondary to CLL on prednisone, stroke, Afib s/p loop recorder 2019, s/p watchman procedure and ablation, recently dc'd Eliquis, seizure disorder on Keppra presenting with AMS in setting of Afib and hypoglycemia. Per spouse at bedside and EMS collateral patient appeared to have a seizure overnight, slumped over and an episode of vomiting with incontinence, was back to baseline mental status this AM, then again appeared to be altered, so EMS called by spouse. Was seen for unremarkable check up yesterday with cardiologist at [place], but following office visit has had multiple weakness episodes when sitting up/standing attributed to "vasovagal". No known recent fevers, chills, cough, diarrhea. 77 year old male with history of ITP secondary to CLL on prednisone, stroke, Afib s/p loop recorder 2019, s/p watchman procedure and ablation, recently dc'd Eliquis, seizure disorder on Keppra presenting with AMS in setting of Afib and hypoglycemia. Per spouse at bedside and EMS collateral patient appeared to have a seizure overnight, slumped over and an episode of vomiting with incontinence, was back to baseline mental status this AM, then again appeared to be altered, so EMS called by spouse. Was seen for unremarkable check up yesterday with cardiologist at [place], but following office visit has had multiple weakness episodes when sitting up/standing attributed to "vasovagal". No known recent fevers, chills, cough, diarrhea. IBW 86.9

## 2023-02-28 NOTE — ED ADULT TRIAGE NOTE - CHIEF COMPLAINT QUOTE
Pt brought in by EMS from home complaining of chest pain and SOB. Pt was hypoglycemic by EMS and given 1 amp of D50. Pt brought directly to room 5.

## 2023-02-28 NOTE — ED ADULT NURSE NOTE - NSFALLRSKASSESSTYPE_ED_ALL_ED
Received paperwork from [facility] extended short term disability.   Placed in provider mailbox. Will need BETTY to fax back. BETTY sent via portal.   
Initial (On Arrival)

## 2023-02-28 NOTE — ED ADULT NURSE NOTE - NSIMPLEMENTINTERV_GEN_ALL_ED
Called pt to inform her that Kai wants her to leave a urine specimen. Confirmed 1/22/20 at 7:00 am. Pt verbalized understanding.   Implemented All Fall with Harm Risk Interventions:  [place] to call system. Call bell, personal items and telephone within reach. Instruct patient to call for assistance. Room bathroom lighting operational. Non-slip footwear when patient is off stretcher. Physically safe environment: no spills, clutter or unnecessary equipment. Stretcher in lowest position, wheels locked, appropriate side rails in place. Provide visual cue, wrist band, yellow gown, etc. Monitor gait and stability. Monitor for mental status changes and reorient to person, place, and time. Review medications for side effects contributing to fall risk. Reinforce activity limits and safety measures with patient and family. Provide visual clues: red socks.

## 2023-02-28 NOTE — H&P ADULT - HISTORY OF PRESENT ILLNESS
77M w/ history of ITP secondary to CLL on prednisone, stroke, Afib s/p loop recorder 2019, s/p watchman procedure and ablation, recently dc'd Eliquis, seizure disorder on Keppra presenting with AMS in setting of Afib and hypoglycemia. Per spouse at bedside and EMS collateral patient appeared to have a seizure overnight, slumped over and an episode of vomiting with incontinence, was back to baseline mental status this AM, then again appeared to be altered, so EMS called by spouse. Was seen for unremarkable check up yesterday with cardiologist at [place], but following office visit has had multiple weakness episodes when sitting up/standing attributed to "vasovagal". No known recent fevers, chills, cough, diarrhea. IBW 86.9    ED course relevant for tachycardia to 130s, noted hypotension. Pt required intubation for airway protection & levo for hypotension. Started on fluids. Noted leukocytosis w/ anemia of 7.2. CMP relevant for transaminitis indications shock liver (AST/ALT 1800/1500), Cr 2.5 (BL .8), electrolytes balanced. VBG w/ pH 6.95/48/10. Transferred to MICU for further mgmt.

## 2023-02-28 NOTE — ED PROCEDURE NOTE - PROCEDURE ADDITIONAL DETAILS
crash fem inserted under non sterile conditions due to emergent need for central access during cardiac arrest, no complications

## 2023-02-28 NOTE — H&P ADULT - ATTENDING COMMENTS
The is a 77 Y.O. male with pmh of CLL, ITP, who presented to the ED with AMS, patient reportedly doing well even up until last night.     Patient was admitted in the ED. Found to have a pH of 6, hypoglycemia, severe lactic acidosis and subsequently arrested in the ED for 5 cycles, Post rosc requiring large amouth of pressors and ventilators set to maxed settings. Was admitted to MICU whoever upon arrival decompensated again. Of not the patient's CBC on arrival showed Blasts of 78 % with flow concerning for leukemic transformation of CLL.     # Cardiac arrest  # Shock  # NAWAF  # Shocked liver  # Blast crisis  # Lactic acidosis  - Patient presented with ams, sangeetha cisis, pre-arrest labs with severe acidosis, lactate, post 25 min arrest with profound shock state.   - Neuro exam with limited reflexes  - Patient's decompensated rapidly again up arrival to ICU.   - Family is at bedside. GIven persistent/pround acdiosis, maxed vent settings and High dose multiple pressors, CRP was not performed.   - Family is aware and all at bedside.

## 2023-02-28 NOTE — ED ADULT NURSE REASSESSMENT NOTE - NS ED NURSE REASSESS COMMENT FT1
Patient in PEA at 1107, ACLS started. Refer to Code Flowsheet. At present, he is intubated, right femoral central line in place. Levophed running at 1 mcg/kg/min. Patient in PEA at 1107, ACLS started. Refer to Code Flowsheet. At present, he is intubated, right femoral central line in place. Levophed running at 1 mcg/kg/min. Critical Care RN Ludmila at bedside. Patient in PEA at 1107, ACLS started. Refer to Code Flowsheet. At present, he is intubated, right femoral central line in place. Levophed 8 mg in  ml running at 1 mcg/kg/min. Critical Care RN Ludmila at bedside.

## 2023-02-28 NOTE — CHART NOTE - NSCHARTNOTEFT_GEN_A_CORE
On physical exam, patient did not respond to verbal or noxious stimuli.  No spontaneous respirations.  Absent heart and breath sounds.  Absent radial and carotid pulses. Pupils are fixed and dilated, no corneal reflex.  EKG rhythm strip shows asystole.  Patient pronounced dead at 2:47pm. Family notified. Emotional support given.

## 2023-02-28 NOTE — CHART NOTE - NSCHARTNOTEFT_GEN_A_CORE
DEATH NOTE    Called to bedside to evaluate the patient for asystole. On physical exam, patient did not respond to verbal or noxious stimuli.  No spontaneous respirations.  Absent heart and breath sounds.  Absent radial and carotid pulses.   Pupils are fixed and dilated, no corneal reflex.  EKG rhythm strip shows asystole. Patient pronounced dead at 2:43. Attending notified.

## 2023-02-28 NOTE — CHART NOTE - NSCHARTNOTEFT_GEN_A_CORE
Pt seen and examined. Intubated, s/p PEA. Spoke w/ wife and son at bedside w/ hematologist Dr. Eddy on the phone. Had long hx of CLL w/ stable counts but only recently count started to rise, 20k few weeks ago to 80k, and now 170k. Smear showing numerous blasts. Spoke w/ pathologist Dr. Johnson, plan for exchange transfusion, spoke w/ MICU team about obtaining double lumen central line. Patient however  unfortunately prior to any further therapy Pt seen and examined. Intubated, s/p PEA. Spoke w/ wife and son at bedside w/ hematologist Dr. Eddy on the phone. Had long hx of CLL w/ stable counts but only recently count started to rise, 20k few weeks ago to 80k, and now 170k. Smear showing numerous blasts flow cytometry confirming AML. Spoke w/ pathologist Dr. Johnson, plan for exchange transfusion, spoke w/ MICU team about obtaining double lumen central line. Patient however  unfortunately prior to any further therapy Pt seen and examined. Intubated, s/p PEA. Spoke w/ wife and son at bedside w/ hematologist Dr. Eddy on the phone. Had long hx of CLL w/ stable counts but only recently count started to rise, 20k few weeks ago to 80k, and now 170k. Smear showing numerous blasts flow cytometry confirming AML. Spoke w/ pathologist Dr. Johnson, plan for exchange transfusion, spoke w/ MICU team about obtaining double lumen central line. Patient however  unfortunately prior to any further therapy.      Case discussed with pathology and with heme/onc fellow, but patient too unstable and ultimately  prior to exchange transfusion. Peripheral smear reviewed and demonstrated >50% monoblasts likely due to new acute leukemia.

## 2023-02-28 NOTE — ED PROVIDER NOTE - PHYSICAL EXAMINATION
Gen - Post-ictal, protecting airway  HEENT - NCAT, pupils 6mm reactive b/l equal  Neck - supple  Resp - CTAB, tachypneic  CV -  tachycardic and irregular, no m/r/g  Abd - soft, nondistended  MSK - no gross deformities  Extrem - no LE edema  Neuro - no focal motor or sensation deficits but limited at this time  Skin - warm, well perfused

## 2023-02-28 NOTE — H&P ADULT - NSHPPHYSICALEXAM_GEN_ALL_CORE
T(C): 37.9 (02-28-23 @ 11:42), Max: 39.6 (02-28-23 @ 10:29)  HR: 125 (02-28-23 @ 14:28) (112 - 196)  BP: 78/48 (02-28-23 @ 13:15) (75/52 - 174/158)  RR: 30 (02-28-23 @ 13:15) (17 - 31)  SpO2: 100% (02-28-23 @ 13:15) (95% - 100%)    CONSTITUTIONAL: AMS  RESP: Intubated  CV: Noted tachycarida, pulses faint  GI: Soft, NT, ND, no rebound, no guarding; no palpable masses; no hepatosplenomegaly; no hernia palpated  MSK: Normal gait; No digital clubbing or cyanosis; examination of the (head/neck/spine/ribs/pelvis, RUE, LUE, RLE, LLE) without misalignment,            Normal ROM without pain, no spinal tenderness, normal muscle strength/tone  SKIN: No rashes or ulcers noted; no subcutaneous nodules or induration palpable  NEURO: A&O x 0.

## 2023-02-28 NOTE — ED ADULT NURSE REASSESSMENT NOTE - NS ED NURSE REASSESS COMMENT FT1
Mobile Critical Care RN: patient presented to ER for AMS, PMHx of seizure, ITP, stroke, AF s/p loop recorder, s/p wachman procedure and ablation. in the ER patient went apneic and into PEA arrest. patient was emergently intubated and received multiple code meds, rosc achieved, refer to code flowesheet. at this time patient remains in room 5, daughter and wife at bedside. patient is intubated to vent with 7.5 ETT 23 @ the lower lip, vent settings 30/500/5/100% satting 100%. minimal clear oral secretions noted, no gag reflect noted during inline suction. remains sinus tachy to 130s, 12 lead EKG completed. MAP maintained above 65 on levophed gtt. PIV x2 and R femoral TLC placed. currently receiving 2L of NS. straight cath for urine and sent to lab. skin noted to have various areas of ecchymosis and hematomas present from blood draws. FS monitored for hypoglycemia and maintained on dextrose 10 @ 100ml/hr.

## 2023-02-28 NOTE — ED PROCEDURE NOTE - CPROC ED TIME OUT STATEMENT1
“Patient's name, , procedure and correct site were confirmed during the [place] Timeout.”
“Patient's name, , procedure and correct site were confirmed during the [place] Timeout.”

## 2023-02-28 NOTE — ED PROVIDER NOTE - CARE PLAN
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

## 2023-03-01 LAB
-  STAPHYLOCOCCUS EPIDERMIDIS: SIGNIFICANT CHANGE UP
GRAM STN FLD: SIGNIFICANT CHANGE UP
METHOD TYPE: SIGNIFICANT CHANGE UP
SPECIMEN SOURCE: SIGNIFICANT CHANGE UP

## 2023-03-01 NOTE — DISCHARGE NOTE FOR THE EXPIRED PATIENT - HOSPITAL COURSE
77M w/ history of ITP secondary to CLL on prednisone, stroke, Afib s/p loop recorder 2019, s/p watchman procedure and ablation, recently dc'd Eliquis, seizure disorder on Keppra presenting with AMS in setting of Afib and hypoglycemia. Per spouse at bedside and EMS collateral patient appeared to have a seizure overnight, slumped over and an episode of vomiting with incontinence, was back to baseline mental status this AM, then again appeared to be altered, so EMS called by spouse. Was seen for unremarkable check up yesterday with cardiologist at [place], but following office visit has had multiple weakness episodes when sitting up/standing attributed to "vasovagal". No known recent fevers, chills, cough, diarrhea. IBW 86.9    ED course relevant for tachycardia to 130s, noted hypotension. Pt required intubation for airway protection & levo for hypotension. Started on fluids. Noted leukocytosis w/ anemia of 7.2. CMP relevant for transaminitis indications shock liver (AST/ALT 1800/1500), Cr 2.5 (BL .8), electrolytes balanced. VBG w/ pH 6.95/48/10. Transferred to MICU for further mgmt. Upon admission, experienced rapid decompensation w/ noted hypotension w/ eventual asystole.

## 2023-03-01 NOTE — DISCHARGE NOTE FOR THE EXPIRED PATIENT - OTHER SIGNIFICANT FINDINGS
Addendum.     Patient's Flow cytology resulted. Patient with likely diagnosis of Acute Myeloid Leukemia.

## 2023-03-02 LAB
-  AMPICILLIN: SIGNIFICANT CHANGE UP
-  CIPROFLOXACIN: SIGNIFICANT CHANGE UP
-  LEVOFLOXACIN: SIGNIFICANT CHANGE UP
-  NITROFURANTOIN: SIGNIFICANT CHANGE UP
-  TETRACYCLINE: SIGNIFICANT CHANGE UP
-  VANCOMYCIN: SIGNIFICANT CHANGE UP
CULTURE RESULTS: SIGNIFICANT CHANGE UP
CULTURE RESULTS: SIGNIFICANT CHANGE UP
METHOD TYPE: SIGNIFICANT CHANGE UP
ORGANISM # SPEC MICROSCOPIC CNT: SIGNIFICANT CHANGE UP
SPECIMEN SOURCE: SIGNIFICANT CHANGE UP
SPECIMEN SOURCE: SIGNIFICANT CHANGE UP

## 2023-03-04 LAB
-  AMPICILLIN/SULBACTAM: SIGNIFICANT CHANGE UP
-  CEFAZOLIN: SIGNIFICANT CHANGE UP
-  CLINDAMYCIN: SIGNIFICANT CHANGE UP
-  ERYTHROMYCIN: SIGNIFICANT CHANGE UP
-  GENTAMICIN: SIGNIFICANT CHANGE UP
-  OXACILLIN: SIGNIFICANT CHANGE UP
-  PENICILLIN: SIGNIFICANT CHANGE UP
-  RIFAMPIN: SIGNIFICANT CHANGE UP
-  TETRACYCLINE: SIGNIFICANT CHANGE UP
-  TRIMETHOPRIM/SULFAMETHOXAZOLE: SIGNIFICANT CHANGE UP
-  VANCOMYCIN: SIGNIFICANT CHANGE UP
CULTURE RESULTS: SIGNIFICANT CHANGE UP
METHOD TYPE: SIGNIFICANT CHANGE UP
ORGANISM # SPEC MICROSCOPIC CNT: SIGNIFICANT CHANGE UP
ORGANISM # SPEC MICROSCOPIC CNT: SIGNIFICANT CHANGE UP
SPECIMEN SOURCE: SIGNIFICANT CHANGE UP

## 2025-02-21 NOTE — ED PROVIDER NOTE - ATTENDING CONTRIBUTION TO CARE
Patient: Kalpesh Rodriguez is a 89 y.o. male who presents today with the following Chief Complaint(s):    Chief Complaint   Patient presents with    Follow-up           Hypertension    Hyperlipidemia    Swelling     He is here for a check up and f/u on hypertension, hyperlipidemia. He is taking medication as prescribed, eating a balanced meal plan and exercises regularly.     Notes diffuse joint aches and pains at times which improve with movement. He stays busy including helping neighbors with errands who are not able to get around as well.     He was discovered to have a WBC of 61.6 k. 92 % lymphocytes. H/H 9.8/31.1. CLL. He feels well. Followed by oncology. No treatment at this time.     He has prostate cancer treated with bicalutamide and Lupron. Urology f/u. PSA undetectable.     More recently has experienced left lower extremity swelling. Doppler negative for DVT. Right leg did not swell.     CT of abdomen and pelvis did not disclose any obstructive process. Swelling in left lower extremity is less. There is no pain.       Current Outpatient Medications   Medication Sig Dispense Refill    lisinopril-hydroCHLOROthiazide (PRINZIDE;ZESTORETIC) 20-12.5 MG per tablet TAKE 1 TABLET BY MOUTH DAILY 90 tablet 3    furosemide (LASIX) 20 MG tablet TAKE 1 TABLET BY MOUTH DAILY AS NEEDED FOR LEG SWELLING 20 tablet 1    Zanubrutinib 80 MG CAPS Take 80 mg by mouth      cloNIDine (CATAPRES) 0.1 MG tablet TAKE ONE TABLET BY MOUTH TWICE A  tablet 3    aspirin 81 MG EC tablet Take 1 tablet by mouth daily      Omega-3 Fatty Acids (FISH OIL) 1000 MG capsule Take 2 capsules by mouth      Calcium Carb-Cholecalciferol (CALCIUM 1000 + D PO) Take 1 tablet by mouth 2 times daily.      Multiple Vitamins-Minerals (CENTRUM SILVER) TABS Take  by mouth daily.        Cholecalciferol (VITAMIN D) 1000 UNIT CAPS Take  by mouth daily.        fish oil-omega-3 fatty acids 1000 MG capsule Take 2 capsules by mouth 3 times daily      amLODIPine  72M h/o seizures presents after a seizure today.  Per son, pt had several loud yawns then had decreased consciousness, was noted to be rigid with some fine tremors in arm.  Lasted 1-2 minutes, son was worried and started compressions but pt quickly regained consciousness.   Patient was confused afterwards for several minutes and now is back to baseline.  On Keppra and is compliant with meds.  No recent illness.  No fever.  Now feels lightheaded. On exam well appearing, nad, mmm, + lateral tongue abrasions, lungs clear, rrr, abd soft, no rash, no edema, 2+ pulses, no focal neuro deficits.  Symptoms cw seizure. Plan for labs, ua, cxr, reeval.

## 2025-06-11 NOTE — H&P PST ADULT - ACTIVITY
Detail Level: Detailed
Detail Level: Generalized
ADLs, moderate housework, activity limited by left foot drop